# Patient Record
Sex: FEMALE | Race: WHITE | NOT HISPANIC OR LATINO | Employment: UNEMPLOYED | URBAN - NONMETROPOLITAN AREA
[De-identification: names, ages, dates, MRNs, and addresses within clinical notes are randomized per-mention and may not be internally consistent; named-entity substitution may affect disease eponyms.]

---

## 2017-01-16 DIAGNOSIS — E55.9 VITAMIN D DEFICIENCY: ICD-10-CM

## 2017-01-17 RX ORDER — ERGOCALCIFEROL 1.25 MG/1
CAPSULE ORAL
Qty: 4 CAPSULE | Refills: 3 | OUTPATIENT
Start: 2017-01-17 | End: 2017-03-16

## 2017-01-17 RX ORDER — ESTRADIOL 0.5 MG/1
TABLET ORAL
Qty: 30 TABLET | Refills: 3 | OUTPATIENT
Start: 2017-01-17 | End: 2017-07-17 | Stop reason: SDUPTHER

## 2017-01-18 ENCOUNTER — OFFICE VISIT (OUTPATIENT)
Dept: FAMILY MEDICINE CLINIC | Facility: CLINIC | Age: 42
End: 2017-01-18

## 2017-01-18 VITALS
HEIGHT: 64 IN | BODY MASS INDEX: 31.41 KG/M2 | DIASTOLIC BLOOD PRESSURE: 70 MMHG | OXYGEN SATURATION: 96 % | TEMPERATURE: 98 F | SYSTOLIC BLOOD PRESSURE: 104 MMHG | HEART RATE: 111 BPM | WEIGHT: 184 LBS

## 2017-01-18 DIAGNOSIS — J30.9 ALLERGIC RHINITIS, UNSPECIFIED ALLERGIC RHINITIS TRIGGER, UNSPECIFIED RHINITIS SEASONALITY: Primary | ICD-10-CM

## 2017-01-18 DIAGNOSIS — M54.2 NECK PAIN: ICD-10-CM

## 2017-01-18 DIAGNOSIS — M50.30 DEGENERATIVE DISC DISEASE, CERVICAL: ICD-10-CM

## 2017-01-18 PROCEDURE — 99213 OFFICE O/P EST LOW 20 MIN: CPT | Performed by: NURSE PRACTITIONER

## 2017-01-18 RX ORDER — HYDROCODONE BITARTRATE AND ACETAMINOPHEN 10; 325 MG/1; MG/1
1 TABLET ORAL EVERY 8 HOURS PRN
Qty: 90 TABLET | Refills: 0 | Status: CANCELLED | OUTPATIENT
Start: 2017-01-18

## 2017-01-18 RX ORDER — LORATADINE 10 MG/1
10 TABLET ORAL DAILY
Qty: 30 TABLET | Refills: 0 | Status: SHIPPED | OUTPATIENT
Start: 2017-01-18 | End: 2017-02-13 | Stop reason: SDUPTHER

## 2017-01-18 NOTE — MR AVS SNAPSHOT
Magalie Rodriguez   1/18/2017 1:00 PM   Office Visit    Dept Phone:  [phone]   Encounter #:  [id number]    Provider:  ALFONSO Mike   Department:  [facility]                Your Full Care Plan              Today's Medication Changes          These changes are accurate as of: 1/18/17  1:55 PM.  If you have any questions, ask your nurse or doctor.               New Medication(s)Ordered:     loratadine 10 MG tablet   Commonly known as:  GNP LORATADINE   Take 1 tablet by mouth Daily.   Started by:  ALFONSO Mike         Stop taking medication(s)listed here:     buPROPion  MG 12 hr tablet   Commonly known as:  WELLBUTRIN SR   Stopped by:  ALFONSO Mike           meloxicam 15 MG tablet   Commonly known as:  MOBIC   Stopped by:  ALFONSO Mike           oxybutynin XL 10 MG 24 hr tablet   Commonly known as:  DITROPAN-XL   Stopped by:  ALFONSO Mike           promethazine 6.25 MG/5ML syrup   Commonly known as:  PHENERGAN   Stopped by:  ALFONSO Mike           tiZANidine 4 MG tablet   Commonly known as:  ZANAFLEX   Stopped by:  ALFONSO Mike           WELLBUTRIN PO   Stopped by:  ALFONSO Mike                Where to Get Your Medications      You can get these medications from any pharmacy     Bring a paper prescription for each of these medications     loratadine 10 MG tablet                  Your Updated Medication List          This list is accurate as of: 1/18/17  1:55 PM.  Always use your most recent med list.                clonazePAM 1 MG tablet   Commonly known as:  KlonoPIN   Take 1 tablet by mouth 2 (Two) Times a Day As Needed for seizures.       diclofenac 1 % gel gel   Commonly known as:  VOLTAREN   Apply 4 g topically 4 (Four) Times a Day As Needed (neck or knee pain).       diltiaZEM  MG 24 hr capsule   Commonly known as:  CARDIZEM CD   TAKE 1 CAPSULE EACH DAY.       "estradiol 0.5 MG tablet   Commonly known as:  ESTRACE   TAKE ONE TABLET BY MOUTH DAILY       gabapentin 600 MG tablet   Commonly known as:  NEURONTIN   Take 1/2 tablet po in the AM, 1/2 in the afternoon, and whole tablet at night.       HYDROcodone-acetaminophen  MG per tablet   Commonly known as:  NORCO   Take 1 tablet by mouth Every 8 (Eight) Hours As Needed for moderate pain (4-6) (moderate to severe pain).       HYDROXYZINE PAMOATE PO       loratadine 10 MG tablet   Commonly known as:  GNP LORATADINE   Take 1 tablet by mouth Daily.       metoclopramide 5 MG tablet   Commonly known as:  REGLAN       omeprazole 40 MG capsule   Commonly known as:  priLOSEC   Take 1 capsule by mouth daily. Each morning       vitamin D 34219 UNITS capsule capsule   Commonly known as:  ERGOCALCIFEROL   TAKE 1 CAPSULE BY MOUTH ONCE WEEKLY               You Were Diagnosed With        Codes Comments    Degenerative disc disease, cervical     ICD-10-CM: M50.30  ICD-9-CM: 722.4     Neck pain     ICD-10-CM: M54.2  ICD-9-CM: 723.1       Medications to be Given to You by a Medical Professional     Due       Frequency    (none) ondansetron ODT (ZOFRAN-ODT) disintegrating tablet 4 mg  Every 6 Hours PRN      Instructions     None    Patient Instructions History      Upcoming Appointments     Visit Type Date Time Department    OFFICE VISIT 1/18/2017  1:00 PM [facility]      [employer] Signup     Our records indicate that your [employer] account has been deactivated. If you would like to reactivate your account, please email [email or web address] or call [phone] to talk to our [employer] staff.             Other Info from Your Visit           Allergies     No Known Allergies      Reason for Visit     Neck Pain refill lortab      Vital Signs     Blood Pressure Pulse Temperature Height Weight Last Menstrual Period    104/70 111 98 °F (36.7 °C) (Tympanic) 64\" (162.6 cm) 184 lb (83.5 kg) (LMP Unknown)    Oxygen Saturation " Body Mass Index Smoking Status             96% 31.58 kg/m2 Former Smoker         Problems and Diagnoses Noted     Degeneration of intervertebral disc of cervical region    Neck pain

## 2017-01-18 NOTE — PROGRESS NOTES
Subjective   Magalie Rodriguez is a 41 y.o. female.  Patient presents today for medication recheck.  Receives [place] for neck pain that she has had for a couple years.  Has seen by pain management and neurosurgeons with statements she has several bone spurs that are causing her issues.  Has started seeing chiropractor 3 times a week and admits she receives more pain relief from them for many been for many 1 mL.  She sees a chiropractor 3 times a week.  Does not need a refill of her pain medication today just wanted to have a follow-up.  Is also having sneezing and a runny nose, started with a scratchy throat.  Also reports she has not smoked in 3 months.  PCP is [facility].    History of Present Illness     The following portions of the patient's history were reviewed and updated as appropriate: allergies, current medications, past family history, past medical history, past social history, past surgical history and problem list.    Review of Systems   Constitutional: Negative for activity change, appetite change, chills, diaphoresis, fatigue, fever and unexpected weight change.   HENT: Positive for rhinorrhea and sneezing. Negative for congestion, dental problem, drooling, ear discharge, ear pain, facial swelling, hearing loss, mouth sores, nosebleeds, postnasal drip, sinus pressure, sore throat, tinnitus, trouble swallowing and voice change.    Eyes: Negative for photophobia, pain, discharge, redness, itching and visual disturbance.   Respiratory: Negative for apnea, cough, choking, chest tightness, shortness of breath, wheezing and stridor.    Cardiovascular: Negative for chest pain, palpitations and leg swelling.   Gastrointestinal: Negative for abdominal distention, abdominal pain, anal bleeding, blood in stool, constipation, diarrhea, nausea, rectal pain and vomiting.   Endocrine: Negative for cold intolerance, heat intolerance, polydipsia, polyphagia and polyuria.   Genitourinary: Negative.     Musculoskeletal: Positive for neck pain.   Skin: Negative.    Allergic/Immunologic: Negative.    Neurological: Negative.    Hematological: Negative.    Psychiatric/Behavioral: Negative.         In the past two weeks the pt has not felt down, depressed, hopeless or lost interest in doing things       Objective   Physical Exam   Constitutional: She is oriented to person, place, and time. She appears well-developed and well-nourished. She is cooperative. She has a sickly appearance.   HENT:   Head: Normocephalic and atraumatic.   Right Ear: External ear normal.   Left Ear: External ear normal.   Nose: Rhinorrhea present.   Mouth/Throat: Uvula is midline, oropharynx is clear and moist and mucous membranes are normal.   Eyes: Conjunctivae, EOM and lids are normal. Pupils are equal, round, and reactive to light.   Neck: Trachea normal, normal range of motion and phonation normal. Neck supple.   Cardiovascular: Normal rate, regular rhythm, normal heart sounds and intact distal pulses.    Pulmonary/Chest: Effort normal and breath sounds normal. No respiratory distress.   Abdominal: Soft. Bowel sounds are normal.   Musculoskeletal: Normal range of motion.        Cervical back: She exhibits pain (rates pain a 4, chronic). She exhibits normal range of motion (is able to participate in flexion, extension, left and right rotation and lateral bending of neck with pain maintaining a 4, movements are fluid).   Neurological: She is alert and oriented to person, place, and time. GCS eye subscore is 4. GCS verbal subscore is 5. GCS motor subscore is 6.   Skin: Skin is warm, dry and intact.   Nursing note and vitals reviewed.      Assessment/Plan   Magalie was seen today for neck pain.    Diagnoses and all orders for this visit:    Allergic rhinitis, unspecified allergic rhinitis trigger, unspecified rhinitis seasonality    Degenerative disc disease, cervical    Neck pain    Other orders  -     Cancel: HYDROcodone-acetaminophen  (NORCO)  MG per tablet; Take 1 tablet by mouth Every 8 (Eight) Hours As Needed for moderate pain (4-6) (moderate to severe pain).  -     loratadine (GNP LORATADINE) 10 MG tablet; Take 1 tablet by mouth Daily.

## 2017-01-18 NOTE — PATIENT INSTRUCTIONS
RENE query complete. Treatment plan to include limited course of prescribed controlled substance. Risks including addiction, benefits, and alternatives presented to patient.  Reviewed RENE# [id number].  Encouraged to clear nasal passages often.  Refrain from smoking exposure.  Continue with current plan of care.  Meds as directed.

## 2017-02-13 DIAGNOSIS — M54.2 NECK PAIN: ICD-10-CM

## 2017-02-13 DIAGNOSIS — M50.30 DEGENERATIVE DISC DISEASE, CERVICAL: ICD-10-CM

## 2017-02-13 RX ORDER — TIZANIDINE 4 MG/1
TABLET ORAL
Qty: 60 TABLET | Refills: 2 | OUTPATIENT
Start: 2017-02-13 | End: 2017-03-16

## 2017-02-13 RX ORDER — LORATADINE 10 MG/1
TABLET ORAL
Qty: 30 TABLET | Refills: 0 | OUTPATIENT
Start: 2017-02-13 | End: 2017-03-21 | Stop reason: SDUPTHER

## 2017-03-07 ENCOUNTER — HOSPITAL ENCOUNTER (EMERGENCY)
Facility: HOSPITAL | Age: 42
Discharge: HOME | End: 2017-03-07
Admitting: EMERGENCY MEDICINE

## 2017-03-07 VITALS
OXYGEN SATURATION: 96 % | WEIGHT: 180 LBS | TEMPERATURE: 100.8 F | BODY MASS INDEX: 30.73 KG/M2 | RESPIRATION RATE: 18 BRPM | DIASTOLIC BLOOD PRESSURE: 56 MMHG | SYSTOLIC BLOOD PRESSURE: 116 MMHG | HEART RATE: 112 BPM | HEIGHT: 64 IN

## 2017-03-07 DIAGNOSIS — J02.9 PHARYNGITIS, UNSPECIFIED ETIOLOGY: Primary | ICD-10-CM

## 2017-03-07 DIAGNOSIS — M54.2 NECK PAIN: ICD-10-CM

## 2017-03-07 DIAGNOSIS — R50.9 FEVER, UNSPECIFIED FEVER CAUSE: ICD-10-CM

## 2017-03-07 DIAGNOSIS — M25.562 CHRONIC PAIN OF LEFT KNEE: ICD-10-CM

## 2017-03-07 DIAGNOSIS — G89.29 CHRONIC PAIN OF LEFT KNEE: ICD-10-CM

## 2017-03-07 DIAGNOSIS — J02.0 STREP SORE THROAT: ICD-10-CM

## 2017-03-07 LAB
FLUAV AG NPH QL: NEGATIVE
FLUBV AG NPH QL IA: NEGATIVE
S PYO AG THROAT QL: POSITIVE

## 2017-03-07 PROCEDURE — 87804 INFLUENZA ASSAY W/OPTIC: CPT | Performed by: EMERGENCY MEDICINE

## 2017-03-07 PROCEDURE — 87880 STREP A ASSAY W/OPTIC: CPT | Performed by: EMERGENCY MEDICINE

## 2017-03-07 PROCEDURE — 99283 EMERGENCY DEPT VISIT LOW MDM: CPT

## 2017-03-07 RX ORDER — AMOXICILLIN 500 MG/1
500 CAPSULE ORAL 3 TIMES DAILY
Qty: 30 CAPSULE | Refills: 0 | Status: SHIPPED | OUTPATIENT
Start: 2017-03-07 | End: 2017-03-16

## 2017-03-07 NOTE — ED NOTES
Discharge instructions reviewed with no additional questions at this time. Scripts x1.  NAD.  VSS.  Pt. Alert and oriented x4. No other needs voiced at this time.  Resps even and unlabord.  Pt. Alert and oriented x4.  No other questions voiced at this time.        Eri Estrada RN  03/07/17 0657

## 2017-03-07 NOTE — ED PROVIDER NOTES
Subjective   HPI Comments: Patient came complaining of sore throat which started yesterday on arrival her temperature 100.8.    Takes medication for chronic pain for high blood pressure for gastric reflux for anxiety but when asked she denies any medical problem.    Past surgical history:  twice hysterectomy, appendix, bilateral tubal ligation.    She used to smoke but not anymore denies any drinking      History provided by:  Patient      Review of Systems   Constitutional: Negative for activity change, appetite change, fatigue and fever.   HENT: Positive for sore throat. Negative for congestion, facial swelling, mouth sores, nosebleeds and trouble swallowing.    Eyes: Negative for discharge, redness and itching.   Respiratory: Negative for apnea, cough and wheezing.    Cardiovascular: Negative for chest pain and palpitations.   Gastrointestinal: Negative for blood in stool and nausea.   Endocrine: Negative for cold intolerance, heat intolerance, polydipsia, polyphagia and polyuria.   Genitourinary: Negative for difficulty urinating, dysuria, flank pain, frequency and hematuria.   Musculoskeletal: Negative for gait problem, joint swelling and neck pain.   Skin: Negative.  Negative for color change, pallor and rash.   Allergic/Immunologic: Negative for environmental allergies.   Neurological: Negative for dizziness, seizures, syncope, speech difficulty, light-headedness, numbness and headaches.   Hematological: Negative for adenopathy.   Psychiatric/Behavioral: Negative for agitation, behavioral problems, confusion and sleep disturbance. The patient is not nervous/anxious.        Past Medical History   Diagnosis Date   • Abnormal Pap smear of cervix    • Aches and pains    • Acid reflux    • Acute sinusitis    • Anxiety    • Brachial neuritis    • Chest pain    • Chondritis      right chest wall   • Cough    • Endometriosis    • Ganglion of wrist      right   • Gastritis    • Headache    • Herpes zoster     • HPV (human papilloma virus) infection    • Hyperlipidemia    • Malaise and fatigue    • Menopausal flushing    • Migraine    • Nausea and vomiting    • Neck pain    • Neoplasm of uncertain behavior of skin of breast    • Obesity, unspecified    • Otitis media        No Known Allergies    Past Surgical History   Procedure Laterality Date   •  section     • Injection of medication       Depo Medrol (80mg)   • Injection of medication       Rocephin (1gm)   • Injection of medication       Toradol (2ml)   • Tubal abdominal ligation     • Hysterectomy       w/BSO       Family History   Problem Relation Age of Onset   • Hypertension Mother    • Heart disease Father    • Hypertension Father    • Other Father      Pacemaker       Social History     Social History   • Marital status:      Spouse name: N/A   • Number of children: N/A   • Years of education: N/A     Social History Main Topics   • Smoking status: Former Smoker     Packs/day: 1.00     Years: 20.00     Types: Cigarettes   • Smokeless tobacco: None   • Alcohol use No   • Drug use: None   • Sexual activity: Not Asked     Other Topics Concern   • None     Social History Narrative           Objective   Physical Exam   Constitutional: She is oriented to person, place, and time. She appears well-developed and well-nourished.   HENT:   Head: Normocephalic and atraumatic.   Nose: Nose normal.   Pharyngeal erythema no swelling and some yellow exudate    Eyes: Conjunctivae and EOM are normal. Pupils are equal, round, and reactive to light.   Neck: Normal range of motion. Neck supple.   Cardiovascular: Normal rate, regular rhythm, normal heart sounds and intact distal pulses.    Pulmonary/Chest: Effort normal and breath sounds normal.   Abdominal: Soft. Bowel sounds are normal.   Musculoskeletal: Normal range of motion.   Neurological: She is alert and oriented to person, place, and time.   Skin: Skin is warm and dry.   Psychiatric: She has a normal mood  and affect. Her behavior is normal. Judgment and thought content normal.   Nursing note and vitals reviewed.      Procedures         ED Course  ED Course        Labs Reviewed   RAPID STREP A SCREEN - Abnormal; Notable for the following:        Result Value    Strep A Ag Positive (*)     All other components within normal limits   INFLUENZA ANTIGEN - Normal        No orders to display               MDM    Final diagnoses:   Pharyngitis, unspecified etiology   Fever, unspecified fever cause   Strep sore throat            Jeremy Barbour MD  03/08/17 7368

## 2017-03-13 DIAGNOSIS — M50.30 DEGENERATIVE DISC DISEASE, CERVICAL: ICD-10-CM

## 2017-03-13 DIAGNOSIS — M54.2 NECK PAIN: ICD-10-CM

## 2017-03-14 RX ORDER — DILTIAZEM HYDROCHLORIDE 120 MG/1
CAPSULE, COATED, EXTENDED RELEASE ORAL
Qty: 30 CAPSULE | Refills: 5 | Status: SHIPPED | OUTPATIENT
Start: 2017-03-14 | End: 2017-03-16

## 2017-03-14 RX ORDER — GABAPENTIN 600 MG/1
TABLET ORAL
Qty: 60 TABLET | Refills: 5 | Status: SHIPPED | OUTPATIENT
Start: 2017-03-14 | End: 2017-03-16

## 2017-03-16 ENCOUNTER — OFFICE VISIT (OUTPATIENT)
Dept: FAMILY MEDICINE CLINIC | Facility: CLINIC | Age: 42
End: 2017-03-16

## 2017-03-16 VITALS
TEMPERATURE: 98.7 F | HEART RATE: 98 BPM | SYSTOLIC BLOOD PRESSURE: 112 MMHG | OXYGEN SATURATION: 98 % | BODY MASS INDEX: 30.93 KG/M2 | HEIGHT: 64 IN | DIASTOLIC BLOOD PRESSURE: 86 MMHG | WEIGHT: 181.2 LBS

## 2017-03-16 DIAGNOSIS — M54.2 NECK PAIN: ICD-10-CM

## 2017-03-16 DIAGNOSIS — F41.9 ANXIETY: ICD-10-CM

## 2017-03-16 DIAGNOSIS — J01.90 ACUTE SINUSITIS, RECURRENCE NOT SPECIFIED, UNSPECIFIED LOCATION: Primary | ICD-10-CM

## 2017-03-16 PROCEDURE — 99213 OFFICE O/P EST LOW 20 MIN: CPT | Performed by: NURSE PRACTITIONER

## 2017-03-16 RX ORDER — CLONAZEPAM 1 MG/1
1 TABLET ORAL 2 TIMES DAILY PRN
Qty: 60 TABLET | Refills: 0 | Status: SHIPPED | OUTPATIENT
Start: 2017-03-16 | End: 2017-06-01 | Stop reason: SDUPTHER

## 2017-03-16 RX ORDER — CEFDINIR 300 MG/1
300 CAPSULE ORAL 2 TIMES DAILY
Qty: 14 CAPSULE | Refills: 0 | Status: SHIPPED | OUTPATIENT
Start: 2017-03-16 | End: 2017-06-01

## 2017-03-16 RX ORDER — IBUPROFEN 600 MG/1
600 TABLET ORAL 2 TIMES DAILY PRN
Qty: 60 TABLET | Refills: 5 | Status: SHIPPED | OUTPATIENT
Start: 2017-03-16 | End: 2017-12-05 | Stop reason: SDUPTHER

## 2017-03-16 NOTE — PROGRESS NOTES
Subjective   Magalie Rodriguez is a 41 y.o. female       Chief Complaint   Patient presents with   • Hip Pain     not all the time only with certain movements.   • Sore Throat     about 1 week, went to ER in [place], started on Antibiotics, finished.   • Anxiety     refill klonopin         Hip Pain      Sore Throat    Associated symptoms include congestion, headaches and neck pain. Pertinent negatives include no abdominal pain, coughing, diarrhea or vomiting.   Anxiety   Symptoms include nervous/anxious behavior.          Here to f/u on chronic neck pain and anxiety.     He neck pain has improved with chiropractic care.  She saw a surgeon for a second opinion and he didn't recommend surgery. She is no longer taking Lortab.     Her anxiety is well-controlled with her current medications and she needs a refill.     She had strep throat a week ago and completed Amoxicillin. Her throat remains sore. She is having yellow nasal drainage.       The following portions of the patient's history were reviewed and updated as appropriate: allergies, current medications, past family history, past medical history, past social history and problem list.    Review of Systems   Constitutional: Positive for fatigue. Negative for activity change and chills.   HENT: Positive for congestion, rhinorrhea, sinus pressure and sore throat. Negative for postnasal drip.    Eyes: Negative for pain, discharge, itching and visual disturbance.   Respiratory: Negative for cough and wheezing.    Cardiovascular: Negative for leg swelling.   Gastrointestinal: Negative for abdominal pain, blood in stool, constipation, diarrhea and vomiting.   Endocrine: Negative for polydipsia and polyuria.   Genitourinary: Negative for dysuria, flank pain, frequency, hematuria and urgency.   Musculoskeletal: Positive for back pain and neck pain. Negative for arthralgias, gait problem and neck stiffness.   Skin: Negative for rash.   Neurological: Positive for  headaches. Negative for tremors, seizures and syncope.   Hematological: Negative for adenopathy. Does not bruise/bleed easily.   Psychiatric/Behavioral: Positive for sleep disturbance. Negative for dysphoric mood. The patient is nervous/anxious.        Objective   Physical Exam   Constitutional: She is oriented to person, place, and time. She appears well-developed and well-nourished. No distress.   HENT:   Yellow pnd   Eyes: Conjunctivae are normal. Pupils are equal, round, and reactive to light. Right eye exhibits no discharge. Left eye exhibits no discharge. No scleral icterus.   Neck: Neck supple. No thyromegaly present.   Cardiovascular: Normal rate, regular rhythm and normal heart sounds.    No murmur heard.  No carotid bruit   Pulmonary/Chest: Effort normal and breath sounds normal. No respiratory distress. She has no wheezes. She has no rales.   Musculoskeletal: She exhibits tenderness. She exhibits no edema or deformity.   Cervical and Lumbosacral tenderness    Dorsal left hand swollen   Lymphadenopathy:     She has no cervical adenopathy.   Neurological: She is alert and oriented to person, place, and time. She displays abnormal reflex. No cranial nerve deficit. Coordination normal.   Posture mildly stopped and gait is hobbling. Right arm and hand  strength diminished at 3-4+. Right leg strength diminished at 4+.Right achilles DTR diminished.    Skin: Skin is warm and dry. No rash noted. No pallor.   Psychiatric: She has a normal mood and affect. Her behavior is normal.   Nursing note and vitals reviewed.      Assessment/Plan   Problems Addressed this Visit        Nervous and Auditory    Neck pain    Relevant Medications    ibuprofen (ADVIL,MOTRIN) 600 MG tablet       Other    Anxiety    Relevant Medications    clonazePAM (KlonoPIN) 1 MG tablet      Other Visit Diagnoses     Acute sinusitis, recurrence not specified, unspecified location    -  Primary    Relevant Medications    cefdinir (OMNICEF) 300  MG capsule             Continue current medications. Reviewed potential medication side effects and benefits. Instructed to report side effects. Report if symptoms worsen or persist. Understanding expressed.    F/U 3 mos and prn. She can call between visits for refills if she needs.

## 2017-03-21 RX ORDER — LORATADINE 10 MG/1
TABLET ORAL
Qty: 30 TABLET | Refills: 0 | Status: SHIPPED | OUTPATIENT
Start: 2017-03-21 | End: 2017-06-01 | Stop reason: SDUPTHER

## 2017-06-01 ENCOUNTER — OFFICE VISIT (OUTPATIENT)
Dept: FAMILY MEDICINE CLINIC | Facility: CLINIC | Age: 42
End: 2017-06-01

## 2017-06-01 VITALS
HEART RATE: 105 BPM | WEIGHT: 179 LBS | HEIGHT: 64 IN | OXYGEN SATURATION: 97 % | RESPIRATION RATE: 17 BRPM | BODY MASS INDEX: 30.56 KG/M2 | TEMPERATURE: 97.8 F | SYSTOLIC BLOOD PRESSURE: 116 MMHG | DIASTOLIC BLOOD PRESSURE: 76 MMHG

## 2017-06-01 DIAGNOSIS — M54.12 BRACHIAL NEURITIS: ICD-10-CM

## 2017-06-01 DIAGNOSIS — F41.9 ANXIETY: Primary | ICD-10-CM

## 2017-06-01 DIAGNOSIS — J30.9 ALLERGIC RHINITIS, UNSPECIFIED ALLERGIC RHINITIS TRIGGER, UNSPECIFIED RHINITIS SEASONALITY: ICD-10-CM

## 2017-06-01 PROCEDURE — 99213 OFFICE O/P EST LOW 20 MIN: CPT | Performed by: NURSE PRACTITIONER

## 2017-06-01 RX ORDER — DILTIAZEM HYDROCHLORIDE 120 MG/1
120 CAPSULE, COATED, EXTENDED RELEASE ORAL DAILY
Refills: 5 | COMMUNITY
Start: 2017-05-09

## 2017-06-01 RX ORDER — LORATADINE 10 MG/1
10 TABLET ORAL DAILY
Qty: 30 TABLET | Refills: 5 | Status: SHIPPED | OUTPATIENT
Start: 2017-06-01

## 2017-06-01 RX ORDER — CLONAZEPAM 2 MG/1
TABLET ORAL
Qty: 60 TABLET | Refills: 0 | Status: SHIPPED | OUTPATIENT
Start: 2017-06-01 | End: 2017-06-29 | Stop reason: SDUPTHER

## 2017-06-01 RX ORDER — BUPROPION HYDROCHLORIDE 150 MG/1
150 TABLET, EXTENDED RELEASE ORAL DAILY
Refills: 5 | COMMUNITY
Start: 2017-05-09

## 2017-06-01 RX ORDER — CLONAZEPAM 1 MG/1
1 TABLET ORAL 2 TIMES DAILY PRN
Qty: 60 TABLET | Refills: 0 | Status: SHIPPED | OUTPATIENT
Start: 2017-06-01 | End: 2017-06-01

## 2017-06-01 RX ORDER — LORATADINE 10 MG/1
10 TABLET ORAL DAILY
Qty: 30 TABLET | Refills: 0 | Status: SHIPPED | OUTPATIENT
Start: 2017-06-01 | End: 2017-06-01 | Stop reason: SDUPTHER

## 2017-06-01 RX ORDER — ESCITALOPRAM OXALATE 10 MG/1
10 TABLET ORAL DAILY
Qty: 30 TABLET | Refills: 2 | Status: SHIPPED | OUTPATIENT
Start: 2017-06-01 | End: 2017-08-25 | Stop reason: SDUPTHER

## 2017-06-06 RX ORDER — BUPROPION HYDROCHLORIDE 150 MG/1
TABLET, EXTENDED RELEASE ORAL
Qty: 30 TABLET | Refills: 5 | Status: SHIPPED | OUTPATIENT
Start: 2017-06-06 | End: 2017-07-17 | Stop reason: SDUPTHER

## 2017-06-06 RX ORDER — OMEPRAZOLE 40 MG/1
CAPSULE, DELAYED RELEASE ORAL
Qty: 30 CAPSULE | Refills: 5 | Status: SHIPPED | OUTPATIENT
Start: 2017-06-06

## 2017-06-29 DIAGNOSIS — F41.9 ANXIETY: ICD-10-CM

## 2017-06-29 RX ORDER — CLONAZEPAM 2 MG/1
TABLET ORAL
Qty: 60 TABLET | Refills: 0 | Status: SHIPPED | OUTPATIENT
Start: 2017-06-29 | End: 2017-07-17 | Stop reason: SDUPTHER

## 2017-06-30 RX ORDER — ONDANSETRON 4 MG/1
TABLET, ORALLY DISINTEGRATING ORAL
Qty: 30 TABLET | Refills: 1 | Status: SHIPPED | OUTPATIENT
Start: 2017-06-30

## 2017-07-17 ENCOUNTER — OFFICE VISIT (OUTPATIENT)
Dept: FAMILY MEDICINE CLINIC | Facility: CLINIC | Age: 42
End: 2017-07-17

## 2017-07-17 VITALS
HEART RATE: 80 BPM | BODY MASS INDEX: 29.19 KG/M2 | DIASTOLIC BLOOD PRESSURE: 64 MMHG | SYSTOLIC BLOOD PRESSURE: 114 MMHG | WEIGHT: 171 LBS | HEIGHT: 64 IN

## 2017-07-17 DIAGNOSIS — S93.402A SPRAIN OF LEFT ANKLE, UNSPECIFIED LIGAMENT, INITIAL ENCOUNTER: Primary | ICD-10-CM

## 2017-07-17 DIAGNOSIS — Z78.0 POSTMENOPAUSAL: ICD-10-CM

## 2017-07-17 DIAGNOSIS — S93.401A SPRAIN OF RIGHT ANKLE, UNSPECIFIED LIGAMENT, INITIAL ENCOUNTER: ICD-10-CM

## 2017-07-17 DIAGNOSIS — F41.9 ANXIETY: ICD-10-CM

## 2017-07-17 DIAGNOSIS — G89.29 CHRONIC PAIN OF LEFT ANKLE: ICD-10-CM

## 2017-07-17 DIAGNOSIS — M25.572 CHRONIC PAIN OF LEFT ANKLE: ICD-10-CM

## 2017-07-17 PROCEDURE — 99213 OFFICE O/P EST LOW 20 MIN: CPT | Performed by: NURSE PRACTITIONER

## 2017-07-17 RX ORDER — CLONAZEPAM 2 MG/1
TABLET ORAL
Qty: 60 TABLET | Refills: 0 | Status: SHIPPED | OUTPATIENT
Start: 2017-07-17 | End: 2017-08-24 | Stop reason: SDUPTHER

## 2017-07-17 RX ORDER — ESTRADIOL 0.5 MG/1
0.5 TABLET ORAL DAILY
Qty: 30 TABLET | Refills: 3 | Status: SHIPPED | OUTPATIENT
Start: 2017-07-17 | End: 2017-11-27 | Stop reason: SDUPTHER

## 2017-07-17 RX ORDER — GABAPENTIN 600 MG/1
600 TABLET ORAL
Refills: 5 | COMMUNITY
Start: 2017-06-06

## 2017-07-17 NOTE — PROGRESS NOTES
Subjective   Magalie Rodriguez is a 41 y.o. female.     Pain   Associated symptoms include arthralgias and joint swelling. Pertinent negatives include no abdominal pain, chest pain, chills, congestion, coughing, fatigue, fever, headaches, myalgias, nausea, neck pain, rash, sore throat, vomiting or weakness.        Here due to a left ankle sprain that occured a month ago. The ankle continues to swell and feels unstable.  She keeps rolling and reinjuring the ankle.  She has been carrying her weight more so on the right side since the sprain and now the right ankle swelling and feels unstable as well.  The pain and swelling to the ankles has been unrelieved by rest, elevation, ice, wrapping the joints and is worsening over time.    Her panic attacks have improved with current medications.     Her neck pain is improved with chiropractic care.         The following portions of the patient's history were reviewed and updated as appropriate: past family history, past medical history, past social history, past surgical history and problem list.  Current Outpatient Prescriptions on File Prior to Visit   Medication Sig   • buPROPion SR (WELLBUTRIN SR) 150 MG 12 hr tablet Take 150 mg by mouth Daily.   • escitalopram (LEXAPRO) 10 MG tablet Take 1 tablet by mouth Daily.   • ibuprofen (ADVIL,MOTRIN) 600 MG tablet Take 1 tablet by mouth 2 (Two) Times a Day As Needed for Mild Pain (1-3) or Moderate Pain (4-6).   • loratadine (CLARITIN) 10 MG tablet Take 1 tablet by mouth Daily.   • omeprazole (priLOSEC) 40 MG capsule TAKE 1 CAPSULE BY MOUTH DAILY. EACH MORNING   • ondansetron ODT (ZOFRAN-ODT) 4 MG disintegrating tablet DISSOLVE 1 TABLET BY MOUTH EVERY 4 HOURS AS NEEDED FOR NAUSEA   • diltiaZEM CD (CARDIZEM CD) 120 MG 24 hr capsule Take 120 mg by mouth Daily.     Current Facility-Administered Medications on File Prior to Visit   Medication   • ondansetron ODT (ZOFRAN-ODT) disintegrating tablet 4 mg     Review of Systems  "  Constitutional: Negative for activity change, chills, fatigue and fever.   HENT: Negative for congestion, rhinorrhea, sinus pressure and sore throat.    Eyes: Negative for pain, discharge, itching and visual disturbance.   Respiratory: Negative for cough, shortness of breath and wheezing.    Cardiovascular: Negative for chest pain, palpitations and leg swelling.   Gastrointestinal: Negative for abdominal pain, blood in stool, constipation, diarrhea, nausea and vomiting.   Endocrine: Negative for polydipsia and polyuria.   Genitourinary: Negative for dysuria, flank pain, frequency, hematuria and urgency.   Musculoskeletal: Positive for arthralgias, gait problem and joint swelling. Negative for back pain, myalgias, neck pain and neck stiffness.   Skin: Negative for rash.   Neurological: Negative for dizziness, tremors, seizures, syncope, weakness and headaches.   Hematological: Negative for adenopathy. Does not bruise/bleed easily.   Psychiatric/Behavioral: Negative for confusion, dysphoric mood, sleep disturbance and suicidal ideas. The patient is not nervous/anxious.        Objective    Vitals:    07/17/17 1254   BP: 114/64   Pulse: 80   Weight: 171 lb (77.6 kg)   Height: 64\" (162.6 cm)   PainSc: 0-No pain     Physical Exam   Constitutional: She is oriented to person, place, and time. She appears well-developed and well-nourished. No distress.   Eyes: Conjunctivae are normal. Pupils are equal, round, and reactive to light. Right eye exhibits no discharge. Left eye exhibits no discharge. No scleral icterus.   Neck: Neck supple. No thyromegaly present.   Cardiovascular: Normal rate, regular rhythm and normal heart sounds.    No murmur heard.  Pulmonary/Chest: Effort normal and breath sounds normal. No respiratory distress. She has no wheezes. She has no rales.   Musculoskeletal: She exhibits no edema or deformity.   Left lateral ankle maleolus with effusion.  Mild right lateral ankle effusion .  Pain to both ankles " with dorsiflexion.    Lymphadenopathy:     She has no cervical adenopathy.   Neurological: She is alert and oriented to person, place, and time. No cranial nerve deficit. Coordination normal.   No balance disturbance observed.    Skin: Skin is warm and dry. No rash noted. No pallor.   Psychiatric: She has a normal mood and affect. Her behavior is normal.   Nursing note and vitals reviewed.      Assessment/Plan   Magalie was seen today for pain.    Diagnoses and all orders for this visit:    Sprain of left ankle, unspecified ligament, initial encounter  -     MRI ankle left  wo contrast; Future    Chronic pain of left ankle    Anxiety  -     clonazePAM (KlonoPIN) 2 MG tablet; Take 1/2 to whole tablet po bid prn anxiety.    Postmenopausal  -     estradiol (ESTRACE) 0.5 MG tablet; Take 1 tablet by mouth Daily.    Sprain of right ankle, unspecified ligament, initial encounter  -     MRI ankle right wo contrast; Future        MRI ordered. She wants to call her insurance first to check on radiology coverage.   Continue Ace wrap, ice and elevation.   Continue current medications. Reviewed potential medication side effects and benefits. Instructed to report side effects. Report if symptoms worsen or persist. Understanding expressed.    F/U in 3 mos and prn.

## 2017-07-26 ENCOUNTER — OFFICE VISIT (OUTPATIENT)
Dept: OBSTETRICS AND GYNECOLOGY | Facility: CLINIC | Age: 42
End: 2017-07-26

## 2017-07-26 VITALS
BODY MASS INDEX: 29.02 KG/M2 | WEIGHT: 170 LBS | RESPIRATION RATE: 18 BRPM | SYSTOLIC BLOOD PRESSURE: 110 MMHG | HEART RATE: 90 BPM | DIASTOLIC BLOOD PRESSURE: 64 MMHG | HEIGHT: 64 IN

## 2017-07-26 DIAGNOSIS — Z72.0 TOBACCO USE: ICD-10-CM

## 2017-07-26 DIAGNOSIS — Z78.9 EXCESSIVE CAFFEINE INTAKE: ICD-10-CM

## 2017-07-26 DIAGNOSIS — N64.4 BREAST PAIN, LEFT: Primary | ICD-10-CM

## 2017-07-26 PROCEDURE — 99213 OFFICE O/P EST LOW 20 MIN: CPT | Performed by: NURSE PRACTITIONER

## 2017-07-26 NOTE — PROGRESS NOTES
"Subjective   Chief Complaint   Patient presents with   • Breast Problem     left breast swelling and pain x 4 weeks     Magalie Rodriguez is a 41 y.o. year old  presenting to be seen because of left breast pain throughout entire breast. 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 Pain is aching and tender to the touch. Worst pain over scar from tumor removal. Pt states she can't stand to wear a bra. Denies palpating any lumps. Pain has been occurring x 4 weeks. She notes breast to be swollen. Denies erythema, warmth, wounds, or trauma. She has tried Ibuprofen 600mg with mild relief. Has started Vit E oil and applying to breasts. Mother had breast cancer postmenopause and is in remission. Pt is on estradiol 0.5mg.     Pt recently started smoking again after quitting for some time. She is back to 2ppd. She also notes excessive caffeine intake of a 12 pack of dr. Hernandez daily plus coffee. She is aware of the effects of these on the breast and has quit smoking and caffeine in the past.     · Last mammogram: was done on approximately 16 and the result was: Birads II (Benign findings).  · Last breast MRI: she has never had a MRI    No Additional Complaints Reported     The following portions of the patient's history were reviewed and updated as appropriate:problem list, current medications, allergies, past family history, past medical history, past social history and past surgical history    Review of Systems   Constitutional: Negative.  Negative for chills and fever.   Respiratory: Negative.  Negative for shortness of breath.    Cardiovascular: Negative.  Negative for chest pain.   Neurological: Negative for dizziness, syncope, light-headedness and headaches.         Objective   /64 (BP Location: Right arm, Patient Position: Sitting, Cuff Size: Adult)  Pulse 90  Resp 18  Ht 64\" (162.6 cm)  Wt " 170 lb (77.1 kg)  LMP  (LMP Unknown)  Breastfeeding? No  BMI 29.18 kg/m2    General:  well developed; well nourished  no acute distress   Breasts:  Examined in supine position  Symmetric without masses or skin dimpling  Nipples normal without inversion, lesions or discharge  There are no palpable axillary nodes  Right breast WNL. No pain on palpation. Left breast tender throughout entire breast, worst in lower quadrants and over scar at 9:00 position. Fibrocystic tissue throughout without discrete mass noted   Heart::  Heart sounds are normal.  Regular rate and rhythm without murmur, gallop or rub.   Lungs: Normal expansion.  Clear to auscultation.  No rales, rhonchi, or wheezing.     Lab Review   No data reviewed    Imaging   Mammogram report       Assessment   1. Breast pain, left  2. Excessive caffeine intake  3. Tobacco use     Plan   1. Diagnostic left mammogram and ultrasound PRN scheduled for Monday Aug 11th at 3pm.   2. Discussed excessive caffeine intake. Pt verbalizes understanding and awareness of effects and agrees to cut out sodas if she can still have some coffee. I instructed 2 cups of coffee per day is max after weaning down from soda.   3. Pt states she is trying to quit smoking again. Encouragement provided. She states she can do it on her own. Does not have a quit date set.   4. RTC in 1month for well woman exam      This note was electronically signed.    Jill Lucas, APRN  7/26/2017

## 2017-08-01 ENCOUNTER — HOSPITAL ENCOUNTER (OUTPATIENT)
Dept: MRI IMAGING | Facility: HOSPITAL | Age: 42
Discharge: HOME | End: 2017-08-01
Admitting: NURSE PRACTITIONER

## 2017-08-01 ENCOUNTER — HOSPITAL ENCOUNTER (OUTPATIENT)
Dept: MRI IMAGING | Facility: HOSPITAL | Age: 42
Discharge: HOME | End: 2017-08-01

## 2017-08-01 DIAGNOSIS — S93.402A SPRAIN OF LEFT ANKLE, UNSPECIFIED LIGAMENT, INITIAL ENCOUNTER: ICD-10-CM

## 2017-08-01 DIAGNOSIS — S93.401A SPRAIN OF RIGHT ANKLE, UNSPECIFIED LIGAMENT, INITIAL ENCOUNTER: ICD-10-CM

## 2017-08-01 PROCEDURE — 73721 MRI JNT OF LWR EXTRE W/O DYE: CPT

## 2017-08-03 DIAGNOSIS — R93.89 ABNORMAL MRI: Primary | ICD-10-CM

## 2017-08-09 ENCOUNTER — OFFICE VISIT (OUTPATIENT)
Dept: ORTHOPEDIC SURGERY | Facility: CLINIC | Age: 42
End: 2017-08-09

## 2017-08-09 VITALS — BODY MASS INDEX: 30.3 KG/M2 | WEIGHT: 171 LBS | HEIGHT: 63 IN

## 2017-08-09 DIAGNOSIS — T14.8XXA BONE BRUISE: ICD-10-CM

## 2017-08-09 DIAGNOSIS — S93.491A SPRAIN OF ANTERIOR TALOFIBULAR LIGAMENT, RIGHT, INITIAL ENCOUNTER: ICD-10-CM

## 2017-08-09 DIAGNOSIS — M25.572 ACUTE LEFT ANKLE PAIN: Primary | ICD-10-CM

## 2017-08-09 DIAGNOSIS — M25.571 ACUTE RIGHT ANKLE PAIN: ICD-10-CM

## 2017-08-09 PROCEDURE — 99213 OFFICE O/P EST LOW 20 MIN: CPT | Performed by: NURSE PRACTITIONER

## 2017-08-09 NOTE — PROGRESS NOTES
Magalie Rodriguez is a 41 y.o. female   Primary provider:  ALFONSO Velarde       Chief Complaint   Patient presents with   • Right Ankle - Establish Care       HISTORY OF PRESENT ILLNESS:    HPI Comments: Complains of left ankle and left foot pain since sustaining a fall approximately 6 weeks ago, injuring both ankles.  Patient has been seen her primary care physician for this and had MRIs ordered of both ankles.  Patient primarily complaining of pain today in her left ankle and persistent swelling.  Pain is described as aching.  Pain is worse with 8 bearing, standing and ambulation.  Mild improvement in pain with ice therapy and Ibuprofen.    Ankle Injury    The incident occurred more than 1 week ago. The incident occurred at home. The injury mechanism was a fall. The pain is present in the left ankle. The pain is at a severity of 5/10. The pain is mild. The pain has been intermittent since onset. Associated symptoms include an inability to bear weight. She reports no foreign bodies present. The symptoms are aggravated by weight bearing. She has tried ice and NSAIDs for the symptoms. The treatment provided mild relief.        CONCURRENT MEDICAL HISTORY:    Past Medical History:   Diagnosis Date   • Abnormal Pap smear of cervix    • Aches and pains    • Acid reflux    • Acute sinusitis    • Anxiety    • Brachial neuritis    • Chest pain    • Chondritis     right chest wall   • Cough    • Endometriosis    • Ganglion of wrist     right   • Gastritis    • Headache    • Herpes zoster    • HPV (human papilloma virus) infection    • Hyperlipidemia    • Malaise and fatigue    • Menopausal flushing    • Migraine    • Nausea and vomiting    • Neck pain    • Neoplasm of uncertain behavior of skin of breast    • Obesity, unspecified    • Otitis media        Allergies   Allergen Reactions   • Paxil [Paroxetine Hcl] Confusion         Current Outpatient Prescriptions:   •  buPROPion SR (WELLBUTRIN SR) 150 MG 12 hr  tablet, Take 150 mg by mouth Daily., Disp: , Rfl: 5  •  clonazePAM (KlonoPIN) 2 MG tablet, Take 1/2 to whole tablet po bid prn anxiety., Disp: 60 tablet, Rfl: 0  •  diltiaZEM CD (CARDIZEM CD) 120 MG 24 hr capsule, Take 120 mg by mouth Daily., Disp: , Rfl: 5  •  escitalopram (LEXAPRO) 10 MG tablet, Take 1 tablet by mouth Daily., Disp: 30 tablet, Rfl: 2  •  estradiol (ESTRACE) 0.5 MG tablet, Take 1 tablet by mouth Daily., Disp: 30 tablet, Rfl: 3  •  gabapentin (NEURONTIN) 600 MG tablet, Take 600 mg by mouth every night at bedtime., Disp: , Rfl: 5  •  ibuprofen (ADVIL,MOTRIN) 600 MG tablet, Take 1 tablet by mouth 2 (Two) Times a Day As Needed for Mild Pain (1-3) or Moderate Pain (4-6)., Disp: 60 tablet, Rfl: 5  •  loratadine (CLARITIN) 10 MG tablet, Take 1 tablet by mouth Daily., Disp: 30 tablet, Rfl: 5  •  omeprazole (priLOSEC) 40 MG capsule, TAKE 1 CAPSULE BY MOUTH DAILY. EACH MORNING, Disp: 30 capsule, Rfl: 5  •  ondansetron ODT (ZOFRAN-ODT) 4 MG disintegrating tablet, DISSOLVE 1 TABLET BY MOUTH EVERY 4 HOURS AS NEEDED FOR NAUSEA, Disp: 30 tablet, Rfl: 1    Current Facility-Administered Medications:   •  ondansetron ODT (ZOFRAN-ODT) disintegrating tablet 4 mg, 4 mg, Oral, Q6H PRN, Soraida Murrell, ALFONSO    Past Surgical History:   Procedure Laterality Date   • BREAST BIOPSY     •  SECTION     • HYSTERECTOMY      w/BSO   • INJECTION OF MEDICATION      Depo Medrol (80mg)   • INJECTION OF MEDICATION      Rocephin (1gm)   • INJECTION OF MEDICATION      Toradol (2ml)   • OOPHORECTOMY     • TUBAL ABDOMINAL LIGATION         Family History   Problem Relation Age of Onset   • Hypertension Mother    • Breast cancer Mother    • Heart disease Father    • Hypertension Father    • Other Father      Pacemaker       Social History     Social History   • Marital status:      Spouse name: N/A   • Number of children: N/A   • Years of education: N/A     Occupational History   • Not on file.     Social History Main  "Topics   • Smoking status: Current Every Day Smoker     Packs/day: 2.00     Years: 20.00     Types: Cigarettes   • Smokeless tobacco: Never Used      Comment: had quit for 6 months and started back   • Alcohol use No   • Drug use: No   • Sexual activity: Yes     Partners: Male     Birth control/ protection: Surgical     Other Topics Concern   • Not on file     Social History Narrative        Review of Systems   Constitutional: Negative.    HENT: Negative.    Eyes: Negative.    Respiratory: Negative.    Cardiovascular: Negative.    Gastrointestinal: Negative.    Endocrine: Negative.    Genitourinary: Negative.    Musculoskeletal: Positive for gait problem and joint swelling.        Left ankle pain.    Skin: Negative.    Allergic/Immunologic: Negative.    Hematological: Negative.    Psychiatric/Behavioral: Negative.        PHYSICAL EXAMINATION:       Ht 63\" (160 cm)  Wt 171 lb (77.6 kg)  LMP  (LMP Unknown)  BMI 30.29 kg/m2    Physical Exam    GAIT:     []  Normal  [x]  Antalgic    Assistive device: [x]  None  []  Walker     []  Crutches  []  Cane     []  Wheelchair  []  Stretcher    Ortho Exam         Mri Ankle Left  Wo Contrast    Result Date: 8/1/2017  Narrative: MRI left ankle without contrast. HISTORY: Injury, sprain. TECHNIQUE: Multiplanar multisequence noncontrast images left ankle. Joint effusion anterior and posterior aspect tibiotalar joint. Joint effusion talocalcaneal articulation anteriorly. Bone edema posterior aspect of talus most likely contusion. Intact peroneus longus and brevis tendons. Intact posterior tibialis, flexor hallucis  and flexor digitorum tendons. Fluid surrounding portions the posterior tibialis tendon, and flexor hallucis tendon tenosynovitis. Intact anterior talofibular ligament.     Impression: CONCLUSION: Joint effusion anterior /posterior tibiotalar joint. Fluid effusion, or ganglion talocalcaneal articulation, anteriorly. Fluid surrounding portions of the posterior tibialis " tendon, and flexor hallucis tendon tenosynovitis. Bone edema posterior talus most likely contusion. MRI examination left ankle is otherwise remarkable. Electronically signed by:  Ferdinand Cortes MD  8/1/2017 5:58 PM CDT Workstation: [id number]    Mri Ankle Right Wo Contrast    Result Date: 8/1/2017  Narrative: MRI right ankle without contrast on 8/1/2017 CLINICAL INDICATION: Right ankle sprain, ankle pain and swelling TECHNIQUE: Multiplanar, multisequence MR images are obtained throughout the right ankle without the administration of contrast. This examination was performed on a 1.5 Sunitha magnet. COMPARISON: None FINDINGS: Normal fatty signal is noted in the sinus tarsi. Moderate size plantar calcaneal spur is noted. The Achilles tendon is intact and unremarkable. Peroneus longus and brevis tendons are intact and unremarkable. Small ankle joint effusion is noted. Small amount of fluid is noted around the posterior tibialis tendon consistent with a mild tenosynovitis. The medial ankle tendons are otherwise intact and unremarkable. There is apparent complete tear of the anterior talofibular ligament. The calcaneofibular ligament and anterior tibiofibular ligaments are intact. Bone marrow signal is unremarkable. Plantar fascia appears intact and unremarkable. No mass or fluid collection is noted.     Impression: 1. Complete tear of the anterior talofibular ligament. 2. Small ankle joint effusion. 3. Mild posterior tibialis tenosynovitis. 4. Moderate size plantar calcaneal spur. Electronically signed by:  Jose Carlisle  8/1/2017 9:32 PM CDT Workstation: [id number]          ASSESSMENT:    Diagnoses and all orders for this visit:    Acute left ankle pain    Sprain of anterior talofibular ligament, right, initial encounter    Acute right ankle pain    Bone bruise      PLAN    MRI results of left ankle and right ankle are reviewed today.  Patient primarily complains today of left ankle pain and persistent swelling today.  Right ankle pain has improved. MRI was positive for an effusion and bone edema in the left ankle. Recommend to modify weightbearing, based on her pain.  However, this may be difficult due to the injury noted on MRI of her right ankle, which showed a complete tear of the anterior talofibular ligament.  Recommend an ASO ankle brace today for her left ankle to provide support.  Recommend physical therapy and patient is in agreement with this.  Recommend to continue to elevate left ankle intermittently throughout the day to minimize swelling/pain.  Recommend to continue ice therapy intermittently 3-4 times daily for 20 minutes at a time.  Recommend to continue Ibuprofen as needed for pain.  Follow-up in 6 weeks.  If patient is having any difficulties with the right ankle/right foot, consider podiatry referral for further management of the anterior talofibular ligament injury.     Return in about 6 weeks (around 9/20/2017) for Recheck.    Linda Carty, APRN

## 2017-08-10 PROBLEM — S93.499A SPRAIN OF ANTERIOR TALOFIBULAR LIGAMENT: Status: ACTIVE | Noted: 2017-08-10

## 2017-08-10 PROBLEM — T14.8XXA BONE BRUISE: Status: ACTIVE | Noted: 2017-08-10

## 2017-08-10 PROBLEM — M25.571 ACUTE RIGHT ANKLE PAIN: Status: ACTIVE | Noted: 2017-08-10

## 2017-08-24 DIAGNOSIS — F41.9 ANXIETY: ICD-10-CM

## 2017-08-24 RX ORDER — ESCITALOPRAM OXALATE 10 MG/1
TABLET ORAL
Qty: 30 TABLET | Refills: 2 | OUTPATIENT
Start: 2017-08-24

## 2017-08-24 RX ORDER — CLONAZEPAM 2 MG/1
TABLET ORAL
Qty: 60 TABLET | Refills: 0 | Status: SHIPPED | OUTPATIENT
Start: 2017-08-24

## 2017-08-24 RX ORDER — CLONAZEPAM 2 MG/1
TABLET ORAL
Qty: 60 TABLET | Refills: 0 | OUTPATIENT
Start: 2017-08-24

## 2017-08-25 RX ORDER — ESCITALOPRAM OXALATE 10 MG/1
10 TABLET ORAL DAILY
Qty: 30 TABLET | Refills: 5 | Status: SHIPPED | OUTPATIENT
Start: 2017-08-25

## 2017-09-21 RX ORDER — DILTIAZEM HYDROCHLORIDE 120 MG/1
CAPSULE, COATED, EXTENDED RELEASE ORAL
Qty: 30 CAPSULE | Refills: 5 | Status: SHIPPED | OUTPATIENT
Start: 2017-09-21

## 2017-11-27 DIAGNOSIS — Z78.0 POSTMENOPAUSAL: ICD-10-CM

## 2017-11-27 RX ORDER — ESTRADIOL 0.5 MG/1
TABLET ORAL
Qty: 30 TABLET | Refills: 5 | Status: SHIPPED | OUTPATIENT
Start: 2017-11-27

## 2017-12-05 ENCOUNTER — TELEPHONE (OUTPATIENT)
Dept: FAMILY MEDICINE CLINIC | Facility: CLINIC | Age: 42
End: 2017-12-05

## 2017-12-05 DIAGNOSIS — M47.892 OTHER OSTEOARTHRITIS OF SPINE, CERVICAL REGION: Primary | ICD-10-CM

## 2017-12-05 DIAGNOSIS — M54.2 NECK PAIN: ICD-10-CM

## 2017-12-05 RX ORDER — IBUPROFEN 600 MG/1
600 TABLET ORAL 2 TIMES DAILY PRN
Qty: 60 TABLET | Refills: 5 | Status: SHIPPED | OUTPATIENT
Start: 2017-12-05

## 2017-12-05 RX ORDER — CELECOXIB 200 MG/1
200 CAPSULE ORAL DAILY
Qty: 30 CAPSULE | Refills: 5 | Status: SHIPPED | OUTPATIENT
Start: 2017-12-05

## 2020-12-23 ENCOUNTER — APPOINTMENT (OUTPATIENT)
Dept: GENERAL RADIOLOGY | Facility: HOSPITAL | Age: 45
End: 2020-12-23

## 2020-12-23 ENCOUNTER — HOSPITAL ENCOUNTER (EMERGENCY)
Facility: HOSPITAL | Age: 45
Discharge: HOME | End: 2020-12-23
Attending: FAMILY MEDICINE | Admitting: STUDENT IN AN ORGANIZED HEALTH CARE EDUCATION/TRAINING PROGRAM

## 2020-12-23 VITALS
SYSTOLIC BLOOD PRESSURE: 109 MMHG | WEIGHT: 167 LBS | HEART RATE: 87 BPM | OXYGEN SATURATION: 98 % | DIASTOLIC BLOOD PRESSURE: 69 MMHG | HEIGHT: 63 IN | BODY MASS INDEX: 29.59 KG/M2 | RESPIRATION RATE: 16 BRPM | TEMPERATURE: 98 F

## 2020-12-23 DIAGNOSIS — J44.9 CHRONIC OBSTRUCTIVE PULMONARY DISEASE, UNSPECIFIED COPD TYPE (HCC): ICD-10-CM

## 2020-12-23 DIAGNOSIS — R07.89 CHEST TIGHTNESS: Primary | ICD-10-CM

## 2020-12-23 LAB
ALBUMIN SERPL-MCNC: 3.8 G/DL (ref 3.5–5.2)
ALBUMIN/GLOB SERPL: 1.7 G/DL
ALP SERPL-CCNC: 56 U/L (ref 39–117)
ALT SERPL W P-5'-P-CCNC: 17 U/L (ref 1–33)
ANION GAP SERPL CALCULATED.3IONS-SCNC: 7 MMOL/L (ref 5–15)
AST SERPL-CCNC: 15 U/L (ref 1–32)
BASOPHILS # BLD AUTO: 0.05 10*3/MM3 (ref 0–0.2)
BASOPHILS NFR BLD AUTO: 0.5 % (ref 0–1.5)
BILIRUB SERPL-MCNC: 0.3 MG/DL (ref 0–1.2)
BUN SERPL-MCNC: 10 MG/DL (ref 6–20)
BUN/CREAT SERPL: 12.2 (ref 7–25)
CALCIUM SPEC-SCNC: 8.9 MG/DL (ref 8.6–10.5)
CHLORIDE SERPL-SCNC: 108 MMOL/L (ref 98–107)
CO2 SERPL-SCNC: 26 MMOL/L (ref 22–29)
CREAT SERPL-MCNC: 0.82 MG/DL (ref 0.57–1)
DEPRECATED RDW RBC AUTO: 41 FL (ref 37–54)
EOSINOPHIL # BLD AUTO: 0.28 10*3/MM3 (ref 0–0.4)
EOSINOPHIL NFR BLD AUTO: 2.9 % (ref 0.3–6.2)
ERYTHROCYTE [DISTWIDTH] IN BLOOD BY AUTOMATED COUNT: 12.4 % (ref 12.3–15.4)
GFR SERPL CREATININE-BSD FRML MDRD: 75 ML/MIN/1.73
GLOBULIN UR ELPH-MCNC: 2.3 GM/DL
GLUCOSE SERPL-MCNC: 91 MG/DL (ref 65–99)
HCT VFR BLD AUTO: 39 % (ref 34–46.6)
HGB BLD-MCNC: 13.8 G/DL (ref 12–15.9)
HOLD SPECIMEN: NORMAL
HOLD SPECIMEN: NORMAL
IMM GRANULOCYTES # BLD AUTO: 0.03 10*3/MM3 (ref 0–0.05)
IMM GRANULOCYTES NFR BLD AUTO: 0.3 % (ref 0–0.5)
LYMPHOCYTES # BLD AUTO: 3.03 10*3/MM3 (ref 0.7–3.1)
LYMPHOCYTES NFR BLD AUTO: 30.9 % (ref 19.6–45.3)
MCH RBC QN AUTO: 31.5 PG (ref 26.6–33)
MCHC RBC AUTO-ENTMCNC: 35.4 G/DL (ref 31.5–35.7)
MCV RBC AUTO: 89 FL (ref 79–97)
MONOCYTES # BLD AUTO: 0.76 10*3/MM3 (ref 0.1–0.9)
MONOCYTES NFR BLD AUTO: 7.8 % (ref 5–12)
NEUTROPHILS NFR BLD AUTO: 5.65 10*3/MM3 (ref 1.7–7)
NEUTROPHILS NFR BLD AUTO: 57.6 % (ref 42.7–76)
NRBC BLD AUTO-RTO: 0 /100 WBC (ref 0–0.2)
NT-PROBNP SERPL-MCNC: 38.6 PG/ML (ref 0–450)
PLATELET # BLD AUTO: 207 10*3/MM3 (ref 140–450)
PMV BLD AUTO: 9.6 FL (ref 6–12)
POTASSIUM SERPL-SCNC: 3.9 MMOL/L (ref 3.5–5.2)
PROT SERPL-MCNC: 6.1 G/DL (ref 6–8.5)
RBC # BLD AUTO: 4.38 10*6/MM3 (ref 3.77–5.28)
SODIUM SERPL-SCNC: 141 MMOL/L (ref 136–145)
TROPONIN T SERPL-MCNC: <0.01 NG/ML (ref 0–0.03)
TROPONIN T SERPL-MCNC: <0.01 NG/ML (ref 0–0.03)
WBC # BLD AUTO: 9.8 10*3/MM3 (ref 3.4–10.8)
WHOLE BLOOD HOLD SPECIMEN: NORMAL
WHOLE BLOOD HOLD SPECIMEN: NORMAL

## 2020-12-23 PROCEDURE — 96374 THER/PROPH/DIAG INJ IV PUSH: CPT

## 2020-12-23 PROCEDURE — 25010000002 METHYLPREDNISOLONE PER 125 MG: Performed by: PHYSICIAN ASSISTANT

## 2020-12-23 PROCEDURE — 71045 X-RAY EXAM CHEST 1 VIEW: CPT

## 2020-12-23 PROCEDURE — 93010 ELECTROCARDIOGRAM REPORT: CPT | Performed by: INTERNAL MEDICINE

## 2020-12-23 PROCEDURE — 83880 ASSAY OF NATRIURETIC PEPTIDE: CPT

## 2020-12-23 PROCEDURE — 84484 ASSAY OF TROPONIN QUANT: CPT | Performed by: FAMILY MEDICINE

## 2020-12-23 PROCEDURE — 99284 EMERGENCY DEPT VISIT MOD MDM: CPT

## 2020-12-23 PROCEDURE — 93005 ELECTROCARDIOGRAM TRACING: CPT | Performed by: FAMILY MEDICINE

## 2020-12-23 PROCEDURE — 93005 ELECTROCARDIOGRAM TRACING: CPT

## 2020-12-23 PROCEDURE — 80053 COMPREHEN METABOLIC PANEL: CPT

## 2020-12-23 PROCEDURE — 85025 COMPLETE CBC W/AUTO DIFF WBC: CPT

## 2020-12-23 PROCEDURE — 84484 ASSAY OF TROPONIN QUANT: CPT

## 2020-12-23 RX ORDER — DOXYCYCLINE 100 MG/1
100 CAPSULE ORAL 2 TIMES DAILY
Qty: 14 CAPSULE | Refills: 0 | Status: SHIPPED | OUTPATIENT
Start: 2020-12-23 | End: 2020-12-30

## 2020-12-23 RX ORDER — PREDNISONE 20 MG/1
40 TABLET ORAL DAILY
Qty: 10 TABLET | Refills: 0 | Status: SHIPPED | OUTPATIENT
Start: 2020-12-23 | End: 2020-12-28

## 2020-12-23 RX ORDER — METHYLPREDNISOLONE SODIUM SUCCINATE 125 MG/2ML
125 INJECTION, POWDER, LYOPHILIZED, FOR SOLUTION INTRAMUSCULAR; INTRAVENOUS ONCE
Status: COMPLETED | OUTPATIENT
Start: 2020-12-23 | End: 2020-12-23

## 2020-12-23 RX ORDER — SODIUM CHLORIDE 0.9 % (FLUSH) 0.9 %
10 SYRINGE (ML) INJECTION AS NEEDED
Status: DISCONTINUED | OUTPATIENT
Start: 2020-12-23 | End: 2020-12-23 | Stop reason: HOSPADM

## 2020-12-23 RX ADMIN — METHYLPREDNISOLONE SODIUM SUCCINATE 125 MG: 125 INJECTION, POWDER, FOR SOLUTION INTRAMUSCULAR; INTRAVENOUS at 15:12

## 2020-12-23 RX ADMIN — SODIUM CHLORIDE 1000 ML: 900 INJECTION, SOLUTION INTRAVENOUS at 14:09

## 2020-12-23 NOTE — ED PROVIDER NOTES
Subjective   Presents to emergency department for intermittent chest tightness and difficulty catching her breath for 2 days.  History of COPD.  Endorses every day tobacco smoking.  Denies any personal cardiac history.  Denies active chest pain.      History provided by:  Patient   used: No    Chest Pain  Pain location:  L chest  Pain quality: tightness    Pain radiates to:  Does not radiate  Pain severity:  Mild  Onset quality:  Sudden  Duration:  2 days  Timing:  Intermittent  Progression:  Unchanged  Chronicity:  New  Context: at rest    Associated symptoms: no abdominal pain, no back pain, no dysphagia, no fever, no nausea, no shortness of breath, no vomiting and no weakness        Review of Systems   Constitutional: Negative for chills and fever.   HENT: Negative for sore throat and trouble swallowing.    Eyes: Negative for visual disturbance.   Respiratory: Negative for shortness of breath and wheezing.    Cardiovascular: Positive for chest pain.   Gastrointestinal: Negative for abdominal pain, nausea and vomiting.   Genitourinary: Negative for dysuria and flank pain.   Musculoskeletal: Negative for back pain.   Skin: Negative for color change.   Allergic/Immunologic: Negative for immunocompromised state.   Neurological: Negative for syncope and weakness.   Hematological: Does not bruise/bleed easily.   Psychiatric/Behavioral: Negative for confusion.       Past Medical History:   Diagnosis Date   • Abnormal Pap smear of cervix    • Aches and pains    • Acid reflux    • Acute sinusitis    • Anxiety    • Brachial neuritis    • Chest pain    • Chondritis     right chest wall   • Cough    • Endometriosis    • Ganglion of wrist     right   • Gastritis    • Headache    • Herpes zoster    • HPV (human papilloma virus) infection    • Hyperlipidemia    • Malaise and fatigue    • Menopausal flushing    • Migraine    • Nausea and vomiting    • Neck pain    • Neoplasm of uncertain behavior of skin of  "breast    • Obesity, unspecified    • Otitis media        No Known Allergies    Past Surgical History:   Procedure Laterality Date   • BREAST BIOPSY     •  SECTION     • HYSTERECTOMY      w/BSO   • INJECTION OF MEDICATION      Depo Medrol (80mg)   • INJECTION OF MEDICATION      Rocephin (1gm)   • INJECTION OF MEDICATION      Toradol (2ml)   • OOPHORECTOMY     • TUBAL ABDOMINAL LIGATION         Family History   Problem Relation Age of Onset   • Hypertension Mother    • Breast cancer Mother    • Heart disease Father    • Hypertension Father    • Other Father         Pacemaker       Social History     Socioeconomic History   • Marital status:      Spouse name: Not on file   • Number of children: Not on file   • Years of education: Not on file   • Highest education level: Not on file   Tobacco Use   • Smoking status: Current Every Day Smoker     Packs/day: 2.00     Years: 20.00     Pack years: 40.00     Types: Cigarettes   • Smokeless tobacco: Never Used   • Tobacco comment: had quit for 6 months and started back   Substance and Sexual Activity   • Alcohol use: No   • Drug use: No   • Sexual activity: Yes     Partners: Male     Birth control/protection: Surgical           Objective      /63 (BP Location: Left arm, Patient Position: Lying)   Pulse 88   Temp 98 °F (36.7 °C) (Oral)   Resp 16   Ht 160 cm (63\")   Wt 75.8 kg (167 lb)   LMP  (LMP Unknown)   SpO2 96%   Breastfeeding No   BMI 29.58 kg/m²     Physical Exam  Vitals signs and nursing note reviewed.   Constitutional:       Appearance: She is well-developed.   HENT:      Head: Normocephalic and atraumatic.      Mouth/Throat:      Pharynx: Oropharynx is clear.   Eyes:      Conjunctiva/sclera: Conjunctivae normal.   Cardiovascular:      Rate and Rhythm: Normal rate and regular rhythm.      Pulses: Normal pulses.      Heart sounds: Normal heart sounds.   Pulmonary:      Effort: Pulmonary effort is normal. No respiratory distress.      " Breath sounds: Normal breath sounds. No wheezing, rhonchi or rales.   Skin:     General: Skin is warm.      Capillary Refill: Capillary refill takes less than 2 seconds.   Neurological:      General: No focal deficit present.      Mental Status: She is alert.   Psychiatric:         Mood and Affect: Mood normal.         Behavior: Behavior normal.         Thought Content: Thought content normal.         ECG 12 Lead      Date/Time: 12/23/2020 3:56 PM  Performed by: Aneudy Adams PA-C  Authorized by: Taran Veliz MD   Interpreted by physician  Comparison: compared with previous ECG from 9/10/2014  Similar to previous ECG  Rhythm: sinus rhythm  Rate: normal  BPM: 91  ST Segments: ST segments normal  Clinical impression: normal ECG                 ED Course      Results for orders placed or performed during the hospital encounter of 12/23/20   Troponin    Specimen: Blood   Result Value Ref Range    Troponin T <0.010 0.000 - 0.030 ng/mL   Troponin    Specimen: Blood   Result Value Ref Range    Troponin T <0.010 0.000 - 0.030 ng/mL   Comprehensive Metabolic Panel    Specimen: Blood   Result Value Ref Range    Glucose 91 65 - 99 mg/dL    BUN 10 6 - 20 mg/dL    Creatinine 0.82 0.57 - 1.00 mg/dL    Sodium 141 136 - 145 mmol/L    Potassium 3.9 3.5 - 5.2 mmol/L    Chloride 108 (H) 98 - 107 mmol/L    CO2 26.0 22.0 - 29.0 mmol/L    Calcium 8.9 8.6 - 10.5 mg/dL    Total Protein 6.1 6.0 - 8.5 g/dL    Albumin 3.80 3.50 - 5.20 g/dL    ALT (SGPT) 17 1 - 33 U/L    AST (SGOT) 15 1 - 32 U/L    Alkaline Phosphatase 56 39 - 117 U/L    Total Bilirubin 0.3 0.0 - 1.2 mg/dL    eGFR Non African Amer 75 >60 mL/min/1.73    Globulin 2.3 gm/dL    A/G Ratio 1.7 g/dL    BUN/Creatinine Ratio 12.2 7.0 - 25.0    Anion Gap 7.0 5.0 - 15.0 mmol/L   BNP    Specimen: Blood   Result Value Ref Range    proBNP 38.6 0.0 - 450.0 pg/mL   CBC Auto Differential    Specimen: Blood   Result Value Ref Range    WBC 9.80 3.40 - 10.80 10*3/mm3    RBC 4.38  3.77 - 5.28 10*6/mm3    Hemoglobin 13.8 12.0 - 15.9 g/dL    Hematocrit 39.0 34.0 - 46.6 %    MCV 89.0 79.0 - 97.0 fL    MCH 31.5 26.6 - 33.0 pg    MCHC 35.4 31.5 - 35.7 g/dL    RDW 12.4 12.3 - 15.4 %    RDW-SD 41.0 37.0 - 54.0 fl    MPV 9.6 6.0 - 12.0 fL    Platelets 207 140 - 450 10*3/mm3    Neutrophil % 57.6 42.7 - 76.0 %    Lymphocyte % 30.9 19.6 - 45.3 %    Monocyte % 7.8 5.0 - 12.0 %    Eosinophil % 2.9 0.3 - 6.2 %    Basophil % 0.5 0.0 - 1.5 %    Immature Grans % 0.3 0.0 - 0.5 %    Neutrophils, Absolute 5.65 1.70 - 7.00 10*3/mm3    Lymphocytes, Absolute 3.03 0.70 - 3.10 10*3/mm3    Monocytes, Absolute 0.76 0.10 - 0.90 10*3/mm3    Eosinophils, Absolute 0.28 0.00 - 0.40 10*3/mm3    Basophils, Absolute 0.05 0.00 - 0.20 10*3/mm3    Immature Grans, Absolute 0.03 0.00 - 0.05 10*3/mm3    nRBC 0.0 0.0 - 0.2 /100 WBC   Light Blue Top   Result Value Ref Range    Extra Tube hold for add-on    Green Top (Gel)   Result Value Ref Range    Extra Tube Hold for add-ons.    Lavender Top   Result Value Ref Range    Extra Tube hold for add-on    Gold Top - SST   Result Value Ref Range    Extra Tube Hold for add-ons.      Xr Chest 1 View    Result Date: 12/23/2020  Narrative: PROCEDURE: Portable chest x-ray TECHNIQUE: Single frontal view of the chest COMPARISON: 9/10/14 HISTORY: Chest Pain triage protocol Chest Pain Triage Protocol FINDINGS: The lungs appear clear. Heart, hilar, and mediastinal structures appear normal. No osseous abnormality is seen.     Impression: CONCLUSION: No acute pulmonary disease. Electronically signed by:  Herbert Cummings MD  12/23/2020 1:31 PM [facility] Workstation: [id number]         Discussed results with patient.  Gave educational materials.  Advised close follow up with PCP/Cardiology.  Return to emergency department for new or worsening symptoms.                                  HEART Score (for prediction of 6-week risk of major adverse cardiac event) reviewed and/or performed as part of the patient  evaluation and treatment planning process.  The result associated with this review/performance is: 2       MDM    Final diagnoses:   Chest tightness   Chronic obstructive pulmonary disease, unspecified COPD type (CMS/[facility])            Aneudy Adams, MIKEY  12/23/20 1963

## 2021-01-11 LAB
QT INTERVAL: 376 MS
QTC INTERVAL: 462 MS

## 2022-04-28 ENCOUNTER — TRANSCRIBE ORDERS (OUTPATIENT)
Dept: ORTHOPEDIC SURGERY | Facility: CLINIC | Age: 47
End: 2022-04-28

## 2022-04-28 DIAGNOSIS — G56.01 CARPAL TUNNEL SYNDROME OF RIGHT WRIST: Primary | ICD-10-CM

## 2022-04-29 DIAGNOSIS — M25.531 RIGHT WRIST PAIN: Primary | ICD-10-CM

## 2022-05-10 ENCOUNTER — OFFICE VISIT (OUTPATIENT)
Dept: ORTHOPEDIC SURGERY | Facility: CLINIC | Age: 47
End: 2022-05-10

## 2022-05-10 VITALS — HEIGHT: 63 IN | WEIGHT: 187 LBS | BODY MASS INDEX: 33.13 KG/M2 | OXYGEN SATURATION: 99 % | HEART RATE: 83 BPM

## 2022-05-10 DIAGNOSIS — M79.641 RIGHT HAND PAIN: Primary | ICD-10-CM

## 2022-05-10 PROCEDURE — 99203 OFFICE O/P NEW LOW 30 MIN: CPT | Performed by: ORTHOPAEDIC SURGERY

## 2022-05-10 RX ORDER — MELOXICAM 7.5 MG/1
7.5 TABLET ORAL DAILY
COMMUNITY

## 2022-05-10 NOTE — PROGRESS NOTES
Magalie Rodriguez is a 46 y.o. female   Primary provider:  Provider, No Known       Chief Complaint   Patient presents with   • Right Hand - Pain, Initial Evaluation     X-rays done today in office  HISTORY OF PRESENT ILLNESS:    46 RHD F with a few years of persistent R wrist pain.  Pain localizes to radiocarpal joint and ulnar border of hand.  She also occ gets pain in MCP joints, but not more distally in the fingers.  More recently, she has also noted a tremor or spasm along the ulnar border of her hand.  +Night pain.  Denies numbness/tingling.  +NSAIDs.  No CSIs.  No PT/OT.  No sx.    Pain  This is a new problem. The current episode started more than 1 month ago (2 months). The problem occurs constantly (moderate). The problem has been gradually worsening. Associated symptoms include joint swelling. Associated symptoms comments: Aching,popping/snapping. Nothing aggravates the symptoms. She has tried nothing for the symptoms. The treatment provided no relief.        CONCURRENT MEDICAL HISTORY:    Past Medical History:   Diagnosis Date   • Abnormal Pap smear of cervix    • Aches and pains    • Acid reflux    • Acute sinusitis    • Anxiety    • Brachial neuritis    • Chest pain    • Chondritis     right chest wall   • Cough    • Endometriosis    • Ganglion of wrist     right   • Gastritis    • Headache    • Herpes zoster    • HPV (human papilloma virus) infection    • Hyperlipidemia    • Malaise and fatigue    • Menopausal flushing    • Migraine    • Nausea and vomiting    • Neck pain    • Neoplasm of uncertain behavior of skin of breast    • Obesity, unspecified    • Otitis media        No Known Allergies      Current Outpatient Medications:   •  buPROPion SR (WELLBUTRIN SR) 150 MG 12 hr tablet, Take 150 mg by mouth Daily., Disp: , Rfl: 5  •  celecoxib (CeleBREX) 200 MG capsule, Take 1 capsule by mouth Daily., Disp: 30 capsule, Rfl: 5  •  clonazePAM (KlonoPIN) 2 MG tablet, Take 1/2 to whole tablet po bid prn  anxiety., Disp: 60 tablet, Rfl: 0  •  diltiaZEM CD (CARDIZEM CD) 120 MG 24 hr capsule, Take 120 mg by mouth Daily., Disp: , Rfl: 5  •  diltiaZEM CD (CARDIZEM CD) 120 MG 24 hr capsule, TAKE ONE CAPSULE BY MOUTH DAILY, Disp: 30 capsule, Rfl: 5  •  escitalopram (LEXAPRO) 10 MG tablet, Take 1 tablet by mouth Daily., Disp: 30 tablet, Rfl: 5  •  estradiol (ESTRACE) 0.5 MG tablet, TAKE ONE TABLET BY MOUTH EVERY DAY, Disp: 30 tablet, Rfl: 5  •  gabapentin (NEURONTIN) 600 MG tablet, Take 600 mg by mouth every night at bedtime., Disp: , Rfl: 5  •  ibuprofen (ADVIL,MOTRIN) 600 MG tablet, Take 1 tablet by mouth 2 (Two) Times a Day As Needed for Mild Pain  or Moderate Pain ., Disp: 60 tablet, Rfl: 5  •  loratadine (CLARITIN) 10 MG tablet, Take 1 tablet by mouth Daily., Disp: 30 tablet, Rfl: 5  •  omeprazole (priLOSEC) 40 MG capsule, TAKE 1 CAPSULE BY MOUTH DAILY. EACH MORNING, Disp: 30 capsule, Rfl: 5  •  ondansetron ODT (ZOFRAN-ODT) 4 MG disintegrating tablet, DISSOLVE 1 TABLET BY MOUTH EVERY 4 HOURS AS NEEDED FOR NAUSEA, Disp: 30 tablet, Rfl: 1    Current Facility-Administered Medications:   •  ondansetron ODT (ZOFRAN-ODT) disintegrating tablet 4 mg, 4 mg, Oral, Q6H PRN, Soraida Murrell APRN    Past Surgical History:   Procedure Laterality Date   • BREAST BIOPSY     •  SECTION     • HYSTERECTOMY      w/BSO   • INJECTION OF MEDICATION      Depo Medrol (80mg)   • INJECTION OF MEDICATION      Rocephin (1gm)   • INJECTION OF MEDICATION      Toradol (2ml)   • OOPHORECTOMY     • TUBAL ABDOMINAL LIGATION         Family History   Problem Relation Age of Onset   • Hypertension Mother    • Breast cancer Mother    • Heart disease Father    • Hypertension Father    • Other Father         Pacemaker        Social History     Socioeconomic History   • Marital status:    Tobacco Use   • Smoking status: Current Every Day Smoker     Packs/day: 2.00     Years: 20.00     Pack years: 40.00     Types: Cigarettes   • Smokeless  tobacco: Never Used   • Tobacco comment: had quit for 6 months and started back   Substance and Sexual Activity   • Alcohol use: No   • Drug use: No   • Sexual activity: Yes     Partners: Male     Birth control/protection: Surgical        Review of Systems   Respiratory:        Breathing difficulties     Musculoskeletal: Positive for joint swelling.       PHYSICAL EXAMINATION:       LMP  (LMP Unknown)     Physical Exam    GAIT:     []  Normal  []  Antalgic    Assistive device: []  None  []  Walker     []  Crutches  []  Cane     []  Wheelchair  []  Stretcher    Ortho Exam  NAD  RUE: Multiple superficial excoriations over dorsal aspect of wrist and forearm.  +TTP volar>dorsal aspect of radiocarpal joint.  +Mild TTP over hypothenar eminence.  No obvious thenar/hypothenar wasting.  Negative Carpal tunnel Tinel's.  +Durkan's.  Negative Phalen's.  +Guyon's canal Tinel's.  Negative cubital tunnel Tinel's.  +Wrist pain with resisted WF/pronation.  No pain with resisted WE/supination.  No TTP over proximal forearm or elbow.  Decr sens ulnar border of hand, otherwise SILT.      No results found.        ASSESSMENT:    Diagnoses and all orders for this visit:    Right hand pain  -     XR Hand 3+ View Right          PLAN  46 RHD F with a few years of R wrist pain and a few months of spasm/tremor involving the hypothenar muscles.  Unclear exact etiology of her sxs.  She could have an occult ganglion cyst at the wrist.  She could have some compression of the ulnar nerve.  Will send for an MRI of the wrist and EMG/NCV studies to further evaluate.  RTC after the studies are done.  No follow-ups on file.    Alyssa Bruce, MILI

## 2022-05-26 ENCOUNTER — APPOINTMENT (OUTPATIENT)
Dept: MRI IMAGING | Facility: HOSPITAL | Age: 47
End: 2022-05-26

## 2022-06-01 ENCOUNTER — TELEPHONE (OUTPATIENT)
Dept: ORTHOPEDIC SURGERY | Facility: CLINIC | Age: 47
End: 2022-06-01

## 2022-06-01 ENCOUNTER — HOSPITAL ENCOUNTER (OUTPATIENT)
Dept: MRI IMAGING | Facility: HOSPITAL | Age: 47
Discharge: HOME | End: 2022-06-01
Admitting: ORTHOPAEDIC SURGERY

## 2022-06-01 DIAGNOSIS — M79.641 RIGHT HAND PAIN: ICD-10-CM

## 2022-06-01 PROCEDURE — 73221 MRI JOINT UPR EXTREM W/O DYE: CPT

## 2022-06-01 NOTE — TELEPHONE ENCOUNTER
Hola    Patient is wanting to know if you can give her something for pain besides Ibuprofen.  She has had her MRI and wanting on EMG    [facility]

## 2022-06-06 ENCOUNTER — HOSPITAL ENCOUNTER (EMERGENCY)
Facility: HOSPITAL | Age: 47
Discharge: HOME | End: 2022-06-07
Attending: EMERGENCY MEDICINE | Admitting: EMERGENCY MEDICINE

## 2022-06-06 ENCOUNTER — APPOINTMENT (OUTPATIENT)
Dept: GENERAL RADIOLOGY | Facility: HOSPITAL | Age: 47
End: 2022-06-06

## 2022-06-06 DIAGNOSIS — R07.9 CHEST PAIN, UNSPECIFIED TYPE: Primary | ICD-10-CM

## 2022-06-06 LAB
ALBUMIN SERPL-MCNC: 3.8 G/DL (ref 3.5–5.2)
ALBUMIN/GLOB SERPL: 1.7 G/DL
ALP SERPL-CCNC: 50 U/L (ref 39–117)
ALT SERPL W P-5'-P-CCNC: 10 U/L (ref 1–33)
ANION GAP SERPL CALCULATED.3IONS-SCNC: 11 MMOL/L (ref 5–15)
APTT PPP: 28.6 SECONDS (ref 20–40.3)
AST SERPL-CCNC: 11 U/L (ref 1–32)
BASOPHILS # BLD AUTO: 0.03 10*3/MM3 (ref 0–0.2)
BASOPHILS NFR BLD AUTO: 0.4 % (ref 0–1.5)
BILIRUB SERPL-MCNC: 0.2 MG/DL (ref 0–1.2)
BUN SERPL-MCNC: 10 MG/DL (ref 6–20)
BUN/CREAT SERPL: 13 (ref 7–25)
CALCIUM SPEC-SCNC: 9.1 MG/DL (ref 8.6–10.5)
CHLORIDE SERPL-SCNC: 105 MMOL/L (ref 98–107)
CO2 SERPL-SCNC: 27 MMOL/L (ref 22–29)
CREAT SERPL-MCNC: 0.77 MG/DL (ref 0.57–1)
D-DIMER, QUANTITATIVE (MAD,POW, STR): 358 NG/ML (FEU) (ref 0–470)
DEPRECATED RDW RBC AUTO: 39.3 FL (ref 37–54)
EGFRCR SERPLBLD CKD-EPI 2021: 95.9 ML/MIN/1.73
EOSINOPHIL # BLD AUTO: 0.1 10*3/MM3 (ref 0–0.4)
EOSINOPHIL NFR BLD AUTO: 1.4 % (ref 0.3–6.2)
ERYTHROCYTE [DISTWIDTH] IN BLOOD BY AUTOMATED COUNT: 12.4 % (ref 12.3–15.4)
GLOBULIN UR ELPH-MCNC: 2.3 GM/DL
GLUCOSE SERPL-MCNC: 100 MG/DL (ref 65–99)
HCT VFR BLD AUTO: 37.3 % (ref 34–46.6)
HGB BLD-MCNC: 13.1 G/DL (ref 12–15.9)
HOLD SPECIMEN: NORMAL
HOLD SPECIMEN: NORMAL
IMM GRANULOCYTES # BLD AUTO: 0.02 10*3/MM3 (ref 0–0.05)
IMM GRANULOCYTES NFR BLD AUTO: 0.3 % (ref 0–0.5)
INR PPP: 1.06 (ref 0.8–1.2)
LYMPHOCYTES # BLD AUTO: 2.47 10*3/MM3 (ref 0.7–3.1)
LYMPHOCYTES NFR BLD AUTO: 35 % (ref 19.6–45.3)
MCH RBC QN AUTO: 30.5 PG (ref 26.6–33)
MCHC RBC AUTO-ENTMCNC: 35.1 G/DL (ref 31.5–35.7)
MCV RBC AUTO: 86.7 FL (ref 79–97)
MONOCYTES # BLD AUTO: 0.57 10*3/MM3 (ref 0.1–0.9)
MONOCYTES NFR BLD AUTO: 8.1 % (ref 5–12)
NEUTROPHILS NFR BLD AUTO: 3.86 10*3/MM3 (ref 1.7–7)
NEUTROPHILS NFR BLD AUTO: 54.8 % (ref 42.7–76)
NRBC BLD AUTO-RTO: 0 /100 WBC (ref 0–0.2)
NT-PROBNP SERPL-MCNC: 28.9 PG/ML (ref 0–450)
PLATELET # BLD AUTO: 220 10*3/MM3 (ref 140–450)
PMV BLD AUTO: 8.9 FL (ref 6–12)
POTASSIUM SERPL-SCNC: 3.7 MMOL/L (ref 3.5–5.2)
PROT SERPL-MCNC: 6.1 G/DL (ref 6–8.5)
PROTHROMBIN TIME: 13.6 SECONDS (ref 11.1–15.3)
RBC # BLD AUTO: 4.3 10*6/MM3 (ref 3.77–5.28)
SODIUM SERPL-SCNC: 143 MMOL/L (ref 136–145)
TROPONIN T SERPL-MCNC: <0.01 NG/ML (ref 0–0.03)
TROPONIN T SERPL-MCNC: <0.01 NG/ML (ref 0–0.03)
WBC NRBC COR # BLD: 7.05 10*3/MM3 (ref 3.4–10.8)
WHOLE BLOOD HOLD COAG: NORMAL
WHOLE BLOOD HOLD SPECIMEN: NORMAL

## 2022-06-06 PROCEDURE — 93005 ELECTROCARDIOGRAM TRACING: CPT

## 2022-06-06 PROCEDURE — 85730 THROMBOPLASTIN TIME PARTIAL: CPT | Performed by: EMERGENCY MEDICINE

## 2022-06-06 PROCEDURE — 36415 COLL VENOUS BLD VENIPUNCTURE: CPT

## 2022-06-06 PROCEDURE — 96361 HYDRATE IV INFUSION ADD-ON: CPT

## 2022-06-06 PROCEDURE — 93005 ELECTROCARDIOGRAM TRACING: CPT | Performed by: EMERGENCY MEDICINE

## 2022-06-06 PROCEDURE — 99284 EMERGENCY DEPT VISIT MOD MDM: CPT

## 2022-06-06 PROCEDURE — 85610 PROTHROMBIN TIME: CPT | Performed by: EMERGENCY MEDICINE

## 2022-06-06 PROCEDURE — 96374 THER/PROPH/DIAG INJ IV PUSH: CPT

## 2022-06-06 PROCEDURE — 80053 COMPREHEN METABOLIC PANEL: CPT | Performed by: EMERGENCY MEDICINE

## 2022-06-06 PROCEDURE — 84484 ASSAY OF TROPONIN QUANT: CPT | Performed by: EMERGENCY MEDICINE

## 2022-06-06 PROCEDURE — 83880 ASSAY OF NATRIURETIC PEPTIDE: CPT | Performed by: EMERGENCY MEDICINE

## 2022-06-06 PROCEDURE — 85379 FIBRIN DEGRADATION QUANT: CPT | Performed by: EMERGENCY MEDICINE

## 2022-06-06 PROCEDURE — 85025 COMPLETE CBC W/AUTO DIFF WBC: CPT | Performed by: EMERGENCY MEDICINE

## 2022-06-06 PROCEDURE — 93010 ELECTROCARDIOGRAM REPORT: CPT | Performed by: INTERNAL MEDICINE

## 2022-06-06 PROCEDURE — 71045 X-RAY EXAM CHEST 1 VIEW: CPT

## 2022-06-06 RX ORDER — SODIUM CHLORIDE 0.9 % (FLUSH) 0.9 %
10 SYRINGE (ML) INJECTION AS NEEDED
Status: DISCONTINUED | OUTPATIENT
Start: 2022-06-06 | End: 2022-06-07 | Stop reason: HOSPADM

## 2022-06-06 RX ORDER — SODIUM CHLORIDE 9 MG/ML
125 INJECTION, SOLUTION INTRAVENOUS CONTINUOUS
Status: DISCONTINUED | OUTPATIENT
Start: 2022-06-06 | End: 2022-06-07 | Stop reason: HOSPADM

## 2022-06-06 RX ORDER — ASPIRIN 81 MG/1
324 TABLET, CHEWABLE ORAL ONCE
Status: COMPLETED | OUTPATIENT
Start: 2022-06-06 | End: 2022-06-06

## 2022-06-06 RX ORDER — PANTOPRAZOLE SODIUM 40 MG/10ML
40 INJECTION, POWDER, LYOPHILIZED, FOR SOLUTION INTRAVENOUS ONCE
Status: COMPLETED | OUTPATIENT
Start: 2022-06-06 | End: 2022-06-06

## 2022-06-06 RX ORDER — NITROGLYCERIN 0.4 MG/1
0.4 TABLET SUBLINGUAL
Status: DISCONTINUED | OUTPATIENT
Start: 2022-06-06 | End: 2022-06-07 | Stop reason: HOSPADM

## 2022-06-06 RX ADMIN — SODIUM CHLORIDE 125 ML/HR: 9 INJECTION, SOLUTION INTRAVENOUS at 19:43

## 2022-06-06 RX ADMIN — PANTOPRAZOLE SODIUM 40 MG: 40 INJECTION, POWDER, FOR SOLUTION INTRAVENOUS at 19:43

## 2022-06-06 RX ADMIN — ASPIRIN 324 MG: 81 TABLET, CHEWABLE ORAL at 19:43

## 2022-06-07 VITALS
DIASTOLIC BLOOD PRESSURE: 52 MMHG | RESPIRATION RATE: 16 BRPM | HEIGHT: 64 IN | WEIGHT: 183 LBS | BODY MASS INDEX: 31.24 KG/M2 | HEART RATE: 77 BPM | TEMPERATURE: 98.1 F | OXYGEN SATURATION: 94 % | SYSTOLIC BLOOD PRESSURE: 95 MMHG

## 2022-06-07 LAB — TROPONIN T SERPL-MCNC: <0.01 NG/ML (ref 0–0.03)

## 2022-06-07 PROCEDURE — 96361 HYDRATE IV INFUSION ADD-ON: CPT

## 2022-06-07 PROCEDURE — 84484 ASSAY OF TROPONIN QUANT: CPT | Performed by: EMERGENCY MEDICINE

## 2022-06-07 RX ORDER — SUCRALFATE ORAL 1 G/10ML
1 SUSPENSION ORAL
Qty: 414 ML | Refills: 0 | Status: SHIPPED | OUTPATIENT
Start: 2022-06-07

## 2022-06-07 RX ORDER — ASPIRIN 325 MG
325 TABLET, DELAYED RELEASE (ENTERIC COATED) ORAL DAILY
Qty: 21 TABLET | Refills: 0 | Status: SHIPPED | OUTPATIENT
Start: 2022-06-07

## 2022-06-07 RX ORDER — NITROGLYCERIN 0.4 MG/1
0.4 TABLET SUBLINGUAL
Qty: 30 TABLET | Refills: 0 | Status: SHIPPED | OUTPATIENT
Start: 2022-06-07

## 2022-06-07 RX ADMIN — SODIUM CHLORIDE 125 ML/HR: 9 INJECTION, SOLUTION INTRAVENOUS at 00:15

## 2022-06-07 NOTE — DISCHARGE INSTRUCTIONS
Return ED chest pain, shortness of air, syncope, palpitations, worse condition, any other concerns

## 2022-06-07 NOTE — ED PROVIDER NOTES
"Subjective   45yo female presents ED c/o acute onset substernal chest pain 1700hrs characterized as \"sharp\"/radiating thru to back/associated palpitations/neg exac or relieve factors/lasting approximately 1hr; pt denies chest pain at time of exam.      History provided by:  Patient  Chest Pain  Pain location:  Substernal area  Pain quality: sharp    Pain radiates to:  Upper back  Associated symptoms: palpitations        Review of Systems   Constitutional: Negative.    HENT: Negative.    Respiratory: Negative.    Cardiovascular: Positive for chest pain and palpitations.   Gastrointestinal: Negative.    Genitourinary: Negative.    Musculoskeletal: Negative.    Allergic/Immunologic: Negative for immunocompromised state.   Neurological: Negative for syncope.   All other systems reviewed and are negative.      Past Medical History:   Diagnosis Date   • Abnormal Pap smear of cervix    • Aches and pains    • Acid reflux    • Acute sinusitis    • Anxiety    • Brachial neuritis    • Chest pain    • Chondritis     right chest wall   • Cough    • Endometriosis    • Ganglion of wrist     right   • Gastritis    • Headache    • Herpes zoster    • HPV (human papilloma virus) infection    • Hyperlipidemia    • Malaise and fatigue    • Menopausal flushing    • Migraine    • Nausea and vomiting    • Neck pain    • Neoplasm of uncertain behavior of skin of breast    • Obesity, unspecified    • Otitis media        No Known Allergies    Past Surgical History:   Procedure Laterality Date   • BREAST BIOPSY     •  SECTION     • HYSTERECTOMY      w/BSO   • INJECTION OF MEDICATION      Depo Medrol (80mg)   • INJECTION OF MEDICATION      Rocephin (1gm)   • INJECTION OF MEDICATION      Toradol (2ml)   • OOPHORECTOMY     • TUBAL ABDOMINAL LIGATION         Family History   Problem Relation Age of Onset   • Hypertension Mother    • Breast cancer Mother    • Heart disease Father    • Hypertension Father    • Other Father         Pacemaker "       Social History     Socioeconomic History   • Marital status:    Tobacco Use   • Smoking status: Current Every Day Smoker     Packs/day: 2.00     Years: 20.00     Pack years: 40.00     Types: Cigarettes   • Smokeless tobacco: Never Used   • Tobacco comment: had quit for 6 months and started back   Substance and Sexual Activity   • Alcohol use: No   • Drug use: No   • Sexual activity: Yes     Partners: Male     Birth control/protection: Surgical           Objective   Physical Exam  Vitals and nursing note reviewed.   Constitutional:       Appearance: Normal appearance.   HENT:      Head: Normocephalic and atraumatic.      Mouth/Throat:      Mouth: Mucous membranes are moist.   Eyes:      Pupils: Pupils are equal, round, and reactive to light.   Cardiovascular:      Rate and Rhythm: Normal rate and regular rhythm.      Pulses: Normal pulses.      Heart sounds: Normal heart sounds. No murmur heard.    No friction rub. No gallop.   Pulmonary:      Effort: Pulmonary effort is normal. No respiratory distress.      Breath sounds: Normal breath sounds. No wheezing, rhonchi or rales.   Abdominal:      General: Abdomen is flat. Bowel sounds are normal. There is no distension.      Palpations: Abdomen is soft.      Tenderness: There is no abdominal tenderness. There is no guarding or rebound.   Musculoskeletal:         General: No tenderness.      Cervical back: Normal range of motion and neck supple. No rigidity.      Right lower leg: No edema.      Left lower leg: No edema.   Lymphadenopathy:      Cervical: No cervical adenopathy.   Skin:     General: Skin is warm and dry.   Neurological:      General: No focal deficit present.      Mental Status: She is alert and oriented to person, place, and time.      GCS: GCS eye subscore is 4. GCS verbal subscore is 5. GCS motor subscore is 6.         ECG 12 Lead      Date/Time: 6/6/2022 7:29 PM  Performed by: Leighton Womack MD  Authorized by: Leighton Womack MD    Interpreted by physician  Rhythm: sinus rhythm  Rate: normal  BPM: 83  QRS axis: normal  Conduction: conduction normal  ST Segments: ST segments normal  T depression: aVL  Other: no other findings  Clinical impression: non-specific ECG                 ED Course  ED Course as of 06/07/22 0052   Mon Jun 06, 2022 2203 [place]ton PE score: low probability [SD]      ED Course User Index  [SD] Leighton Womack MD      Labs Reviewed   COMPREHENSIVE METABOLIC PANEL - Abnormal; Notable for the following components:       Result Value    Glucose 100 (*)     All other components within normal limits    Narrative:     GFR Normal >60  Chronic Kidney Disease <60  Kidney Failure <15     TROPONIN (IN-HOUSE) - Normal    Narrative:     Troponin T Reference Range:  <= 0.03 ng/mL-   Negative for AMI  >0.03 ng/mL-     Abnormal for myocardial necrosis.  Clinicians would have to utilize clinical acumen, EKG, Troponin and serial changes to determine if it is an Acute Myocardial Infarction or myocardial injury due to an underlying chronic condition.       Results may be falsely decreased if patient taking Biotin.     TROPONIN (IN-HOUSE) - Normal    Narrative:     Troponin T Reference Range:  <= 0.03 ng/mL-   Negative for AMI  >0.03 ng/mL-     Abnormal for myocardial necrosis.  Clinicians would have to utilize clinical acumen, EKG, Troponin and serial changes to determine if it is an Acute Myocardial Infarction or myocardial injury due to an underlying chronic condition.       Results may be falsely decreased if patient taking Biotin.     BNP (IN-HOUSE) - Normal    Narrative:     Among patients with dyspnea, NT-proBNP is highly sensitive for the detection of acute congestive heart failure. In addition NT-proBNP of <300 pg/ml effectively rules out acute congestive heart failure with 99% negative predictive value.    Results may be falsely decreased if patient taking Biotin.     CBC WITH AUTO DIFFERENTIAL - Normal   APTT - Normal    Narrative:      The recommended Heparin therapeutic range is 68-97 seconds.   PROTIME-INR - Normal    Narrative:     Therapeutic range for most indications is 2.0-3.0 INR,  or 2.5-3.5 for mechanical heart valves.   D-DIMER, QUANTITATIVE - Normal    Narrative:     Dimer values <500 ng/ml FEU are FDA approved as aid in diagnosis of deep venous thrombosis and pulmonary embolism.  This test should not be used in an exclusion strategy with pretest probability alone.    A recent guideline regarding diagnosis for pulmonary thromboembolism recommends an adjusted exclusion criterion of age x 10 ng/ml FEU for patients >50 years of age (Angelina Intern Med 2015; 163: 701-711).     TROPONIN (IN-HOUSE) - Normal    Narrative:     Troponin T Reference Range:  <= 0.03 ng/mL-   Negative for AMI  >0.03 ng/mL-     Abnormal for myocardial necrosis.  Clinicians would have to utilize clinical acumen, EKG, Troponin and serial changes to determine if it is an Acute Myocardial Infarction or myocardial injury due to an underlying chronic condition.       Results may be falsely decreased if patient taking Biotin.     RAINBOW DRAW    Narrative:     The following orders were created for panel order Busy Draw.  Procedure                               Abnormality         Status                     ---------                               -----------         ------                     Green Top (Gel)[[id number]]                                  Final result               Lavender Top[[id number]]                                     Final result               Gold Top - SST[[id number]]                                   Final result               Light Blue Top[[id number]]                                   Final result                 Please view results for these tests on the individual orders.   CBC AND DIFFERENTIAL    Narrative:     The following orders were created for panel order CBC & Differential.  Procedure                               Abnormality          Status                     ---------                               -----------         ------                     CBC Auto Differential[[id number]]        Normal              Final result                 Please view results for these tests on the individual orders.   GREEN TOP   LAVENDER TOP   GOLD TOP - SST   LIGHT BLUE TOP     XR Hand 3+ View Right    Result Date: 5/10/2022  Narrative: Three view right hand HISTORY: Pain AP, lateral and oblique views obtained. COMPARISON: Left hand October 20, 2016 FINDINGS: No fracture or dislocation. No other osseous or articular abnormality.     Impression: CONCLUSION: Normal right hand [id number] Electronically signed by:  Shekhar Lee MD  5/10/2022 5:55 PM CDT Workstation: [id number]    XR Chest 1 View    Result Date: 6/6/2022  Narrative: PROCEDURE:XR CHEST 1 VIEW HISTORY:Chest Pain triage protocol Chest Pain Triage Protocol COMPARISON: December 23, 2020 FINDINGS: The heart appears unremarkable. The lungs are clear there is no alveolar consolidation, effusion or pneumothorax. There are no acute bony or soft tissue abnormalities.     Impression: No acute cardiopulmonary process. Electronically signed by:  Avani Milan MD, BIJAN  6/6/2022 8:10 PM CDT Workstation: [id number]    MRI Wrist Right Without Contrast    Result Date: 6/1/2022  Narrative: EXAM: MRI WRIST RIGHT WO CONTRAST CLINICAL INDICATION: Right wrist pain COMPARISON: There is no previous study for comparison. TECHNIQUE: The MRI was done using coronal T1, PD and STIR, sagittal T1 and STIR and axial T1 and STIR images. FINDINGS: There are mild to moderate degenerative changes of the radiocarpal, trapezioscaphoid and first carpometacarpal joints. The visualized osseous structures otherwise have normal morphology and signal characteristics with no evidence of bone marrow edema, occult fractures, or marrow-replacing bone lesions. The scapholunate and lunotriquetral ligaments are intact. The triangular fibrocartilage is intact.  The carpal tunnel structures and median nerve appear normal. The flexor and extensor tendons appear normal.     Impression: 1. Mild to moderate multifocal degenerative joint disease. 2. Otherwise unremarkable study.  Electronically signed by:  Austin Gold MD  6/1/2022 5:27 PM CDT Workstation: [id number]                                             HEART Score (for prediction of 6-week risk of major adverse cardiac event) reviewed and/or performed as part of the patient evaluation and treatment planning process.  The result associated with this review/performance is: 3       MDM  Number of Diagnoses or Management Options  Chest pain, unspecified type  Diagnosis management comments: Labs/radiographic studies reviewed. CXR no active disease.  Trent neg x2. D dimer negative.  Wells pe score=0 (low risk).  ekg neg ischemia.  HEART score=3 (low risk).  Stable discharge with pmd/cardiology followup.       Amount and/or Complexity of Data Reviewed  Clinical lab tests: reviewed  Tests in the radiology section of CPT®: reviewed  Tests in the medicine section of CPT®: reviewed  Decide to obtain previous medical records or to obtain history from someone other than the patient: yes        Final diagnoses:   Chest pain, unspecified type       ED Disposition  ED Disposition     ED Disposition   Discharge    Condition   Stable    Comment   --             Yesi Sanchez MD  [address] [place] [facility] [phone]    In 1 day      Betty [place] MD  [address] [place] [address] 1ST FLOOR  [facility] [address] [phone]    Schedule an appointment as soon as possible for a visit in 2 days           Medication List      New Prescriptions    aspirin  MG tablet  Take 1 tablet by mouth Daily.     nitroglycerin 0.4 MG SL tablet  Commonly known as: NITROSTAT  Place 1 tablet under the tongue Every 5 (Five) Minutes As Needed for Chest Pain. Take no more than 3 doses in 15 minutes.     sucralfate 1 GM/10ML  suspension  Commonly known as: CARAFATE  Take 10 mL by mouth 4 (Four) Times a Day With Meals & at Bedtime.           Where to Get Your Medications      These medications were sent to [facility] - [employer], [place] - [address] - [phone]  - [phone]   [address] [address]    Phone: [phone]   · aspirin  MG tablet  · nitroglycerin 0.4 MG SL tablet  · sucralfate 1 GM/10ML suspension          Leighton Womack MD  06/07/22 0053

## 2022-06-09 LAB
QT INTERVAL: 378 MS
QTC INTERVAL: 444 MS